# Patient Record
Sex: MALE | Race: WHITE | NOT HISPANIC OR LATINO | Employment: FULL TIME | URBAN - METROPOLITAN AREA
[De-identification: names, ages, dates, MRNs, and addresses within clinical notes are randomized per-mention and may not be internally consistent; named-entity substitution may affect disease eponyms.]

---

## 2020-06-01 ENCOUNTER — HISTORICAL (OUTPATIENT)
Dept: RESPIRATORY THERAPY | Facility: HOSPITAL | Age: 45
End: 2020-06-01

## 2021-05-07 ENCOUNTER — HISTORICAL (OUTPATIENT)
Dept: ADMINISTRATIVE | Facility: HOSPITAL | Age: 46
End: 2021-05-07

## 2021-05-07 LAB
ABS NEUT (OLG): 2.45 X10(3)/MCL (ref 2.1–9.2)
ALBUMIN SERPL-MCNC: 4.3 GM/DL (ref 3.5–5)
ALBUMIN/GLOB SERPL: 1.5 RATIO (ref 1.1–2)
ALP SERPL-CCNC: 69 UNIT/L (ref 40–150)
ALT SERPL-CCNC: 23 UNIT/L (ref 0–55)
AST SERPL-CCNC: 22 UNIT/L (ref 5–34)
BASOPHILS # BLD AUTO: 0 X10(3)/MCL (ref 0–0.2)
BASOPHILS NFR BLD AUTO: 1 %
BILIRUB SERPL-MCNC: 0.3 MG/DL
BILIRUBIN DIRECT+TOT PNL SERPL-MCNC: 0.1 MG/DL (ref 0–0.5)
BILIRUBIN DIRECT+TOT PNL SERPL-MCNC: 0.2 MG/DL (ref 0–0.8)
BUN SERPL-MCNC: 15.2 MG/DL (ref 8.9–20.6)
CALCIUM SERPL-MCNC: 10 MG/DL (ref 8.4–10.2)
CHLORIDE SERPL-SCNC: 104 MMOL/L (ref 98–107)
CO2 SERPL-SCNC: 27 MMOL/L (ref 22–29)
CREAT SERPL-MCNC: 1.18 MG/DL (ref 0.73–1.18)
EOSINOPHIL # BLD AUTO: 0.1 X10(3)/MCL (ref 0–0.9)
EOSINOPHIL NFR BLD AUTO: 2 %
ERYTHROCYTE [DISTWIDTH] IN BLOOD BY AUTOMATED COUNT: 12.8 % (ref 11.5–17)
GLOBULIN SER-MCNC: 2.9 GM/DL (ref 2.4–3.5)
GLUCOSE SERPL-MCNC: 178 MG/DL (ref 74–100)
HCT VFR BLD AUTO: 47.3 % (ref 42–52)
HGB BLD-MCNC: 15.3 GM/DL (ref 14–18)
LYMPHOCYTES # BLD AUTO: 2.2 X10(3)/MCL (ref 0.6–4.6)
LYMPHOCYTES NFR BLD AUTO: 43 %
MCH RBC QN AUTO: 29 PG (ref 27–31)
MCHC RBC AUTO-ENTMCNC: 32.3 GM/DL (ref 33–36)
MCV RBC AUTO: 89.6 FL (ref 80–94)
MONOCYTES # BLD AUTO: 0.4 X10(3)/MCL (ref 0.1–1.3)
MONOCYTES NFR BLD AUTO: 7 %
NEUTROPHILS # BLD AUTO: 2.45 X10(3)/MCL (ref 2.1–9.2)
NEUTROPHILS NFR BLD AUTO: 47 %
PLATELET # BLD AUTO: 261 X10(3)/MCL (ref 130–400)
PMV BLD AUTO: 10.1 FL (ref 9.4–12.4)
POTASSIUM SERPL-SCNC: 4.3 MMOL/L (ref 3.5–5.1)
PROT SERPL-MCNC: 7.2 GM/DL (ref 6.4–8.3)
PSA SERPL-MCNC: 0.45 NG/ML
RBC # BLD AUTO: 5.28 X10(6)/MCL (ref 4.7–6.1)
SODIUM SERPL-SCNC: 142 MMOL/L (ref 136–145)
WBC # SPEC AUTO: 5.2 X10(3)/MCL (ref 4.5–11.5)

## 2022-08-01 DIAGNOSIS — Z00.00 WELL ADULT HEALTH CHECK: Primary | ICD-10-CM

## 2022-08-17 DIAGNOSIS — Z00.00 WELL ADULT HEALTH CHECK: Primary | ICD-10-CM

## 2022-08-19 DIAGNOSIS — Z00.00 WELL ADULT HEALTH CHECK: Primary | ICD-10-CM

## 2022-08-23 ENCOUNTER — CLINICAL SUPPORT (OUTPATIENT)
Dept: INTERNAL MEDICINE | Facility: CLINIC | Age: 47
End: 2022-08-23
Payer: COMMERCIAL

## 2022-08-23 VITALS
SYSTOLIC BLOOD PRESSURE: 131 MMHG | TEMPERATURE: 98 F | HEART RATE: 62 BPM | BODY MASS INDEX: 28.93 KG/M2 | RESPIRATION RATE: 18 BRPM | DIASTOLIC BLOOD PRESSURE: 83 MMHG | OXYGEN SATURATION: 97 % | HEIGHT: 66 IN | WEIGHT: 180 LBS

## 2022-08-23 DIAGNOSIS — Z12.11 ENCOUNTER FOR COLORECTAL CANCER SCREENING: ICD-10-CM

## 2022-08-23 DIAGNOSIS — Z00.00 WELL ADULT HEALTH CHECK: ICD-10-CM

## 2022-08-23 DIAGNOSIS — E83.119 HEMOCHROMATOSIS, UNSPECIFIED HEMOCHROMATOSIS TYPE: Primary | ICD-10-CM

## 2022-08-23 DIAGNOSIS — Z12.12 ENCOUNTER FOR COLORECTAL CANCER SCREENING: ICD-10-CM

## 2022-08-23 LAB
(HCYS)2 SERPL-MCNC: 7.3 UMOL/L (ref 5.1–15.4)
ALBUMIN SERPL-MCNC: 4.3 GM/DL (ref 3.5–5)
ALBUMIN/GLOB SERPL: 1.4 RATIO (ref 1.1–2)
ALP SERPL-CCNC: 66 UNIT/L (ref 40–150)
ALT SERPL-CCNC: 26 UNIT/L (ref 0–55)
APPEARANCE UR: CLEAR
AST SERPL-CCNC: 30 UNIT/L (ref 5–34)
BACTERIA #/AREA URNS AUTO: ABNORMAL /HPF
BASOPHILS # BLD AUTO: 0.03 X10(3)/MCL (ref 0–0.2)
BASOPHILS NFR BLD AUTO: 0.6 %
BILIRUB UR QL STRIP.AUTO: NEGATIVE MG/DL
BILIRUBIN DIRECT+TOT PNL SERPL-MCNC: 0.4 MG/DL
BUN SERPL-MCNC: 18.5 MG/DL (ref 8.9–20.6)
CALCIUM SERPL-MCNC: 9.6 MG/DL (ref 8.4–10.2)
CHLORIDE SERPL-SCNC: 103 MMOL/L (ref 98–107)
CHOLEST SERPL-MCNC: 225 MG/DL
CHOLEST/HDLC SERPL: 6 {RATIO} (ref 0–5)
CO2 SERPL-SCNC: 28 MMOL/L (ref 22–29)
COLOR UR AUTO: COLORLESS
CREAT SERPL-MCNC: 1.06 MG/DL (ref 0.73–1.18)
CRP SERPL HS-MCNC: 0.79 MG/L
DEPRECATED CALCIDIOL+CALCIFEROL SERPL-MC: 33.7 NG/ML (ref 30–80)
DLCO: NORMAL
EOSINOPHIL # BLD AUTO: 0.12 X10(3)/MCL (ref 0–0.9)
EOSINOPHIL NFR BLD AUTO: 2.6 %
ERYTHROCYTE [DISTWIDTH] IN BLOOD BY AUTOMATED COUNT: 12.7 % (ref 11.5–17)
EST. AVERAGE GLUCOSE BLD GHB EST-MCNC: 111.2 MG/DL
FERRITIN SERPL-MCNC: 145.69 NG/ML (ref 21.81–274.66)
FEV1/FVC: NORMAL
FEV1: NORMAL
FVC: NORMAL
GFR SERPLBLD CREATININE-BSD FMLA CKD-EPI: >60 MLS/MIN/1.73/M2
GLOBULIN SER-MCNC: 3 GM/DL (ref 2.4–3.5)
GLUCOSE SERPL-MCNC: 117 MG/DL (ref 74–100)
GLUCOSE UR QL STRIP.AUTO: NORMAL MG/DL
HBA1C MFR BLD: 5.5 %
HCT VFR BLD AUTO: 46.6 % (ref 42–52)
HDLC SERPL-MCNC: 40 MG/DL (ref 35–60)
HGB BLD-MCNC: 16 GM/DL (ref 14–18)
HYALINE CASTS #/AREA URNS LPF: ABNORMAL /LPF
IMM GRANULOCYTES # BLD AUTO: 0.01 X10(3)/MCL (ref 0–0.04)
IMM GRANULOCYTES NFR BLD AUTO: 0.2 %
KETONES UR QL STRIP.AUTO: NEGATIVE MG/DL
LDLC SERPL CALC-MCNC: 163 MG/DL (ref 50–140)
LEUKOCYTE ESTERASE UR QL STRIP.AUTO: NEGATIVE UNIT/L
LYMPHOCYTES # BLD AUTO: 2 X10(3)/MCL (ref 0.6–4.6)
LYMPHOCYTES NFR BLD AUTO: 42.9 %
MCH RBC QN AUTO: 29.4 PG (ref 27–31)
MCHC RBC AUTO-ENTMCNC: 34.3 MG/DL (ref 33–36)
MCV RBC AUTO: 85.7 FL (ref 80–94)
MONOCYTES # BLD AUTO: 0.36 X10(3)/MCL (ref 0.1–1.3)
MONOCYTES NFR BLD AUTO: 7.7 %
MUCOUS THREADS URNS QL MICRO: ABNORMAL /LPF
NEUTROPHILS # BLD AUTO: 2.1 X10(3)/MCL (ref 2.1–9.2)
NEUTROPHILS NFR BLD AUTO: 46 %
NITRITE UR QL STRIP.AUTO: NEGATIVE
NRBC BLD AUTO-RTO: 0 %
PH UR STRIP.AUTO: 5.5 [PH]
PLATELET # BLD AUTO: 245 X10(3)/MCL (ref 130–400)
PMV BLD AUTO: 10.2 FL (ref 7.4–10.4)
POTASSIUM SERPL-SCNC: 4.2 MMOL/L (ref 3.5–5.1)
PROT SERPL-MCNC: 7.3 GM/DL (ref 6.4–8.3)
PROT UR QL STRIP.AUTO: NEGATIVE MG/DL
PSA SERPL-MCNC: 0.56 NG/ML
RBC # BLD AUTO: 5.44 X10(6)/MCL (ref 4.7–6.1)
RBC #/AREA URNS AUTO: ABNORMAL /HPF
RBC UR QL AUTO: NEGATIVE UNIT/L
SODIUM SERPL-SCNC: 141 MMOL/L (ref 136–145)
SP GR UR STRIP.AUTO: 1.01
SQUAMOUS #/AREA URNS LPF: ABNORMAL /HPF
T3FREE SERPL-MCNC: 2.62 PG/ML (ref 1.57–3.91)
T4 FREE SERPL-MCNC: 0.89 NG/DL (ref 0.7–1.48)
TESTOST SERPL-MCNC: 485.81 NG/DL (ref 240.24–870.68)
TLC: NORMAL
TRIGL SERPL-MCNC: 110 MG/DL (ref 34–140)
TSH SERPL-ACNC: 1.15 UIU/ML (ref 0.35–4.94)
UROBILINOGEN UR STRIP-ACNC: NORMAL MG/DL
VLDLC SERPL CALC-MCNC: 22 MG/DL
WBC # SPEC AUTO: 4.7 X10(3)/MCL (ref 4.5–11.5)
WBC #/AREA URNS AUTO: ABNORMAL /HPF

## 2022-08-23 PROCEDURE — 0358T PR BIOELECTRICAL IMPEDANCE WHOLE BODY COMPOSITION ASSESSMENT W/I&R: ICD-10-PCS | Mod: ,,, | Performed by: FAMILY MEDICINE

## 2022-08-23 PROCEDURE — 92551 PURE TONE HEARING TEST AIR: CPT | Mod: ,,, | Performed by: FAMILY MEDICINE

## 2022-08-23 PROCEDURE — 85025 COMPLETE CBC W/AUTO DIFF WBC: CPT

## 2022-08-23 PROCEDURE — 84481 FREE ASSAY (FT-3): CPT

## 2022-08-23 PROCEDURE — 81001 URINALYSIS AUTO W/SCOPE: CPT

## 2022-08-23 PROCEDURE — 99499 PR EXECUTIVE HEALTH VISIT WITH INSURANCE: ICD-10-PCS | Mod: ,,, | Performed by: FAMILY MEDICINE

## 2022-08-23 PROCEDURE — 84443 ASSAY THYROID STIM HORMONE: CPT

## 2022-08-23 PROCEDURE — 99499 UNLISTED E&M SERVICE: CPT | Mod: ,,, | Performed by: FAMILY MEDICINE

## 2022-08-23 PROCEDURE — 93015 CV STRESS TEST SUPVJ I&R: CPT | Mod: ,,, | Performed by: FAMILY MEDICINE

## 2022-08-23 PROCEDURE — 92551 PR PURE TONE HEARING TEST, AIR: ICD-10-PCS | Mod: ,,, | Performed by: FAMILY MEDICINE

## 2022-08-23 PROCEDURE — 93010 EKG 12-LEAD: ICD-10-PCS | Mod: ,,, | Performed by: FAMILY MEDICINE

## 2022-08-23 PROCEDURE — 0358T BIA WHOLE BODY: CPT | Mod: ,,, | Performed by: FAMILY MEDICINE

## 2022-08-23 PROCEDURE — 36415 COLL VENOUS BLD VENIPUNCTURE: CPT | Mod: ,,, | Performed by: FAMILY MEDICINE

## 2022-08-23 PROCEDURE — 93010 ELECTROCARDIOGRAM REPORT: CPT | Mod: ,,, | Performed by: FAMILY MEDICINE

## 2022-08-23 PROCEDURE — 84403 ASSAY OF TOTAL TESTOSTERONE: CPT

## 2022-08-23 PROCEDURE — 83036 HEMOGLOBIN GLYCOSYLATED A1C: CPT

## 2022-08-23 PROCEDURE — 86901 BLOOD TYPING SEROLOGIC RH(D): CPT | Performed by: FAMILY MEDICINE

## 2022-08-23 PROCEDURE — 93005 ELECTROCARDIOGRAM TRACING: CPT | Mod: ,,, | Performed by: FAMILY MEDICINE

## 2022-08-23 PROCEDURE — 93015 PR CARDIAC STRESS TST,COMPLETE: ICD-10-PCS | Mod: ,,, | Performed by: FAMILY MEDICINE

## 2022-08-23 PROCEDURE — 99172 OCULAR FUNCTION SCREEN: CPT | Mod: ,,, | Performed by: FAMILY MEDICINE

## 2022-08-23 PROCEDURE — 93005 EKG 12-LEAD: ICD-10-PCS | Mod: ,,, | Performed by: FAMILY MEDICINE

## 2022-08-23 PROCEDURE — 80053 COMPREHEN METABOLIC PANEL: CPT

## 2022-08-23 PROCEDURE — 99172 PR VISUAL FUNCT SCREENING, BILAT: ICD-10-PCS | Mod: ,,, | Performed by: FAMILY MEDICINE

## 2022-08-23 PROCEDURE — 83090 ASSAY OF HOMOCYSTEINE: CPT

## 2022-08-23 PROCEDURE — 80061 LIPID PANEL: CPT

## 2022-08-23 PROCEDURE — 36415 PR COLLECTION VENOUS BLOOD,VENIPUNCTURE: ICD-10-PCS | Mod: ,,, | Performed by: FAMILY MEDICINE

## 2022-08-23 PROCEDURE — 94010 BREATHING CAPACITY TEST: CPT | Mod: ,,, | Performed by: FAMILY MEDICINE

## 2022-08-23 PROCEDURE — 86141 C-REACTIVE PROTEIN HS: CPT

## 2022-08-23 PROCEDURE — 94010 BREATHING CAPACITY TEST: ICD-10-PCS | Mod: ,,, | Performed by: FAMILY MEDICINE

## 2022-08-23 PROCEDURE — 82728 ASSAY OF FERRITIN: CPT

## 2022-08-23 PROCEDURE — 84439 ASSAY OF FREE THYROXINE: CPT

## 2022-08-23 PROCEDURE — 84153 ASSAY OF PSA TOTAL: CPT

## 2022-08-23 PROCEDURE — 82306 VITAMIN D 25 HYDROXY: CPT

## 2022-08-23 NOTE — PROGRESS NOTES
Subjective:       Patient ID: Garth Flores is a 46 y.o. male.    Chief Complaint: Executive Health Physical.    Garth Flores is a 46 y.o. year old male, who presents today for a preventive medical evaluation.    He reports in the last year he has missed work due to illness only 3 days at which time he had Covid (December 2021).    CURRENT MEDICAL PROBLEMS  Father Sandra reports having depression and anxiety for which he take Trintellix. This was first diagnosed in 2007. He also reports having seasonal allergies diagnosed in 2006 which is controlled with Zyrtec when needed.     He reports that he has been diagnosed with hemachromotosis, or perhaps is a carrier. He is unsure. He received 2 therapeutic phelebotmy  since 2019. His last one was in 2021.   He has a past history of mono-hepatitis and last year had a liver ultrasound. It was recommended at that time to have a repeat US but he missed that appointment. This was done at [facility]. He reports that they found a lesion on this ultrasound and was told it was not too concerning.     PAST MEDICAL HISTORY    Immunizations:  He has his tetanus booster in the last 10 years.  He has received the annual flu vaccine.  He  has not received his Pneumonia vaccine.   He reports he has taken both Hepatitis A and B vaccines.  He has not taken his Shingles vaccine.   He  has taken his Covid Vaccine.     Drug Allergies:  The patient reports no known drug allergies.    Medical Illnesses:  See Current Medical Problems and Review of Systems    Surgical Illnesses:  None    Diagnostic Studies (year completed):  EKG, 2022   Echocardiogram, 2018 (mild to moderate mitral valve prolapse in 2017, repeat in 2018-unchanged)  Carotid Ultrasound, 2019  Abdominal/Pelvic Ultrasound, 2020          PERSONAL HABITS  He does not smoke or use tobacco products. He has a past history of tobacco use. He smoked cigarettes for 17 years (6-10cigs/day. He stopped in 2016. He has a 9 pack year history.   He drinks approximately 5 alcoholic beverages per week. He drinks approximately 11 caffeinated beverages per week. He reports that he is currently following a regular diet. Nearly all of his meals are prepared at home and eats out 5 times per week. He drinks 28 glasses of water a week. On average, he obtains 6-7 hours of sleep each night.    EXERCISE HISTORY  Currently, he exercises on average 5 days a week for 20-40 minutes each time. For aerobic activity he does stair climbers or stationary bike as well as outdoor walking, 3 days per week. For resistance training he does moderate intensity weight training and resistance bands, 5 days per week. And for flexibility training he does yoga, 1 times per week.     STRESS FACTORS  The patient considers his home life to be high stress and his occupation to be of high stress. His greatest source of worry or concern is his Job and Health. He feels that he manages his stress fairly well most of the time. He rates his emotional outlook on life as happy and sad in equal amounts. He rates his overall health as excellent    FAMILY HISTORY  The patient's father  at the age of 74 and had the following medical issues: diabetes, multiple myeloma, quadruple coronary bypass surgery and obesity. His mother also  at the age of 74 from a stroke. She also had morbid obesity and varicose veins. He has 2 siblings, ages 59 and 50 who both have morbid obesity, diabetes and varicose veins.       Care Team (last visit):  PCP: None  Psychiatry: Crys Cooper, NP  Urology: Dr. Elliot Cho (2022) for trouble urinating  Cardiology: Dr. Rod Greene (May 2022) history of abnormal heartbeat,   Allergy: Kishor Rosales (2022)  Eye: Lens Crafter  Chiropractor: Romeo Alvarez, back/neck     Review of Systems   Constitutional: Negative for chills, diaphoresis, fatigue, fever and unexpected weight change.   HENT: Negative for nasal congestion, ear pain, hearing loss, postnasal drip,  "sore throat and trouble swallowing.    Eyes: Negative for redness and visual disturbance.        Reports Decrease in vision since 2012 which is still a problem today. His last exam was done in June 2021.    Respiratory: Negative for cough, chest tightness, shortness of breath and wheezing.    Cardiovascular: Negative for chest pain, palpitations, leg swelling and claudication.   Gastrointestinal: Negative for abdominal pain, anal bleeding, blood in stool, change in bowel habit, constipation, diarrhea, nausea and change in bowel habit.   Genitourinary: Positive for difficulty urinating. Negative for bladder incontinence, dysuria, enuresis, frequency, testicular pain and urgency.        He reports increased awakening at night to urinate.    Musculoskeletal: Negative for arthralgias, back pain, gait problem, joint swelling and myalgias.   Integumentary:  Negative for rash and mole/lesion.        + nail fungus, bilateral large toes. No pain or loss of nail   Neurological: Negative for dizziness, vertigo, light-headedness, headaches and memory loss.   Hematological: Does not bruise/bleed easily.   Psychiatric/Behavioral: Positive for sleep disturbance. Negative for decreased concentration, dysphoric mood, self-injury and suicidal ideas. The patient is nervous/anxious.          Objective:     Weight: 81.6 kg (180 lb), Height: 5' 6" (1.676 m), BSA (Calculated - sq m): 1.95 sq meters, BMI (Calculated): 29.1, BP: 131/83, Pulse: 62, Resp: 18, Temp: 98.1 °F (36.7 °C), SpO2: 97 %    Physical Exam  Vitals reviewed.   Constitutional:       General: He is not in acute distress.     Appearance: Normal appearance.   HENT:      Right Ear: Tympanic membrane, ear canal and external ear normal.      Left Ear: Tympanic membrane, ear canal and external ear normal.      Nose: Congestion present.      Mouth/Throat:      Mouth: Mucous membranes are moist.      Pharynx: Oropharynx is clear.   Eyes:      Extraocular Movements: Extraocular " movements intact.      Conjunctiva/sclera: Conjunctivae normal.      Pupils: Pupils are equal, round, and reactive to light.   Neck:      Vascular: No carotid bruit.   Cardiovascular:      Rate and Rhythm: Normal rate and regular rhythm.      Pulses: Normal pulses.           Femoral pulses are 2+ on the right side and 2+ on the left side.       Dorsalis pedis pulses are 2+ on the right side and 2+ on the left side.        Posterior tibial pulses are 2+ on the right side and 2+ on the left side.      Heart sounds: Normal heart sounds. No murmur heard.    No friction rub. No gallop.   Pulmonary:      Effort: Pulmonary effort is normal.      Breath sounds: Normal breath sounds. No wheezing, rhonchi or rales.   Abdominal:      General: Abdomen is flat. Bowel sounds are normal.      Palpations: Abdomen is soft.      Tenderness: There is no abdominal tenderness. There is no right CVA tenderness or left CVA tenderness.   Musculoskeletal:      Cervical back: Normal range of motion and neck supple.      Right lower leg: No edema.      Left lower leg: No edema.   Lymphadenopathy:      Cervical: No cervical adenopathy.   Skin:     General: Skin is warm and dry.      Capillary Refill: Capillary refill takes less than 2 seconds.      Findings: No lesion or rash.      Comments: Bilateral Large toe nails with mild discoloration. No thickening or tenderness. No onycholysis noted.    Neurological:      General: No focal deficit present.      Mental Status: He is alert and oriented to person, place, and time.      Sensory: No sensory deficit.      Motor: No weakness.      Deep Tendon Reflexes: Reflexes normal.   Psychiatric:         Mood and Affect: Mood normal. Mood is not anxious or depressed. Affect is not blunt.         Speech: Speech normal.         Behavior: Behavior normal.         Thought Content: Thought content normal.         Judgment: Judgment normal.       BIOMETRICS:       Hearing Screening    125Hz 250Hz 500Hz 1000Hz  2000Hz 3000Hz 4000Hz 6000Hz 8000Hz   Right ear:   25 25 25 25 25     Left ear:   25 25 25 25 25        Visual Acuity Screening- Near    Right eye Left eye Both eyes   Without correction: 20/20 20/20 20/20   With correction:      Visual Acuity Screening- Far  40/20 bilateral     Spirometry (see official report)  Normal spirometry    RADIOLOGY:  see official reports  Calcium Score--SCORE SUMMARY. Your total calcium score is 0  Incidental findings: A calcified granuloma is noted in the right lung.      Resting ECG: Rate 72 bpm; Normal Sinus Rhythm; Normal Axis  Impression: Resting electrocardiogram is probably normal. There are no previous studies for comparison.    Stress ECG: Indications for stress testing: Physical fitness assessment, development of an exercise prescription and cardiovascular risk assessment.  EKG and exercise stress test showed a resting heart rate prior to exam of 70 beats per minute, with a blood pressure of 114/80 at the start of the examination. Julio protocol was used for evaluation of stress test. Initial EKG showed normal axis, MI, QRS, and QT intervals, demonstrating a normal EKG. The patient was able to undergo Julio protocol for a total of 10:24, achieving a heart rate maximum of 148 beats per minute, which represents 86 % of predicted maximum. Pericardial leads V2 and V5 and standard lead II were monitored continuously throughout evaluation. All EKG leads were scanned periodically and reported at regular intervals. The patient experienced no chest pain or other cardiac symptoms of significance during or after the examination. The blood pressure response was normal throughout exercise and recovery. There were no electrocardiographic changes consistent with ischemia, and there were no significant abnormalities of patient rhythm.Uncoupled PVCs were noted on exam.   Impression: Normal stress electrocardiogram with no significant signs of ST segment changes that would be consistent with  obvious coronary ischemia.    InBody Assessment  BMI is elevated at 28.7  Percent body fat is elavated at 27.6%  Visceral Fat Area is normal at 95.5 cm2  Recommendation to lose 26lbs of fat mass.       Assessment:       Problem List Items Addressed This Visit    None         Plan:       Impression & Plan:     Physical Exam: Normal exam.     Laboratory Results: Abnormal -   -Cholesterol is elevated. Continue heart healthy diet.   -Fasting blood glucose is elevated. Limit foods with added sugars.      Imaging Results: Normal      Hearing Screening: Normal     Vision Screening: Abnormal - Recommend getting formal eye exam     Lung Function Screening: Normal     ECG/Exercise Treadmill Test: See Impression above. Repeat in 1 year.     Cardiovascular Risk Assessment: Low Risk    ERWIN score: 3.1%   Plan: control cholesterol with strict diet. Recommend heart healthy diet.      Cancer Screening: Colorectal cancer screening is due.       Immunizations: is up to date     In Body Assessment: Abnormal - see recommendations. Increase skeletal muscle mass.          Plan: Exercise Prescription         Frequency: 3-5 sessions/week         Intensity: Moderate (RPE: 10-13)         Type: Aerobic: walking, stationary cycling, aquatic exercise, running          Time: 30 minutes (150 minutes/week)           Plus         Frequency: 2-3 session/week         Type: Strength/Resistance Training: exercises using resistance bands, weight machines, handheld  weights or body weight            Plus         Frequency: 5-7 sessions/week         Type: Flexibility training: stretching, yoga, ariana chi         Time: 5-10 minutes      Plan: Diet recommendations: Lower carb, heart healthy diet such as the Mediterranean-style diet.   -Eat an overall healthy dietary pattern that emphasizes:   -a wide variety of fruits and vegetables   -whole grains and products made up mostly of whole grains   -healthy sources of protein (mostly plants such as legumes and  nuts; fish and seafood; low fat or nonfat dairy; and , if you eat meat and poultry, ensuring it is lean and unprocessed)   -liquid non-tropical vegetable oils   -minimally processed foods   -minimized intake of added sugars   -food prepared with little or no salt   -limited or preferably no alcohol intake    Total daily energy expenditure is 2,542 usman/day  Consider a 500 calorie per day deficit to promote weight loss: 2,042 usman/day.   Daily Macronutrients: for Lower Carb (40/40/20)      204g protein      91g fats      102g carbs    Early identification and prevention are the keys to maintaining overall health and prevention of chronic diseases. For this reason, I recommend yearly physical examinations through this program or with your primary care physician.     Referral sent: PCP  Prescriptions sent:    None    Summary of recommendations:  -Get established with PCP. We will send in a referral   1.  for hemochromatosis follow up and treatment is needed. Will need to get repeat liver Ultrasound as recommended last year.    2. for colorectal cancer screening set up.    3. for repeat imaging of calcified granuloma in Right lung found on CT Calcium score imaging.    4. for increased night time urination.  -Get formal eye exam to test far visual acuity   -Follow a heart healthy diet, consider Mediterranean style diet

## 2022-08-29 ENCOUNTER — TELEPHONE (OUTPATIENT)
Dept: INTERNAL MEDICINE | Facility: CLINIC | Age: 47
End: 2022-08-29
Payer: COMMERCIAL

## 2022-08-29 NOTE — TELEPHONE ENCOUNTER
----- Message from Joellen Fournier MD sent at 8/29/2022  4:06 PM CDT -----  Please schedule patient for a appointment    ----- Message -----  From: Martha Franco LPN  Sent: 8/29/2022   1:37 PM CDT  To: Joellen Fournier MD    Please schedule patient for appt. See Dr. Lopez 's note....

## 2022-09-01 ENCOUNTER — TELEPHONE (OUTPATIENT)
Dept: INTERNAL MEDICINE | Facility: CLINIC | Age: 47
End: 2022-09-01
Payer: COMMERCIAL

## 2022-09-22 DIAGNOSIS — Z00.00 WELLNESS EXAMINATION: Primary | ICD-10-CM

## 2022-09-22 DIAGNOSIS — Z12.5 SCREENING FOR PROSTATE CANCER: ICD-10-CM

## 2022-10-03 ENCOUNTER — OFFICE VISIT (OUTPATIENT)
Dept: INTERNAL MEDICINE | Facility: CLINIC | Age: 47
End: 2022-10-03
Payer: COMMERCIAL

## 2022-10-03 VITALS
SYSTOLIC BLOOD PRESSURE: 112 MMHG | HEART RATE: 77 BPM | WEIGHT: 180.19 LBS | DIASTOLIC BLOOD PRESSURE: 72 MMHG | TEMPERATURE: 98 F | HEIGHT: 66 IN | OXYGEN SATURATION: 97 % | BODY MASS INDEX: 28.96 KG/M2

## 2022-10-03 DIAGNOSIS — Z12.12 ENCOUNTER FOR COLORECTAL CANCER SCREENING: ICD-10-CM

## 2022-10-03 DIAGNOSIS — Z12.11 ENCOUNTER FOR COLORECTAL CANCER SCREENING: ICD-10-CM

## 2022-10-03 DIAGNOSIS — F32.A ANXIETY AND DEPRESSION: ICD-10-CM

## 2022-10-03 DIAGNOSIS — Z12.11 SCREEN FOR COLON CANCER: Primary | ICD-10-CM

## 2022-10-03 DIAGNOSIS — Z23 NEED FOR VACCINATION: ICD-10-CM

## 2022-10-03 DIAGNOSIS — E83.119 HEMOCHROMATOSIS, UNSPECIFIED HEMOCHROMATOSIS TYPE: ICD-10-CM

## 2022-10-03 DIAGNOSIS — F41.9 ANXIETY AND DEPRESSION: ICD-10-CM

## 2022-10-03 DIAGNOSIS — E78.00 PURE HYPERCHOLESTEROLEMIA: ICD-10-CM

## 2022-10-03 DIAGNOSIS — Z86.19 HISTORY OF GONORRHEA: Primary | ICD-10-CM

## 2022-10-03 DIAGNOSIS — Z00.00 WELLNESS EXAMINATION: ICD-10-CM

## 2022-10-03 PROCEDURE — 1160F PR REVIEW ALL MEDS BY PRESCRIBER/CLIN PHARMACIST DOCUMENTED: ICD-10-PCS | Mod: CPTII,,, | Performed by: INTERNAL MEDICINE

## 2022-10-03 PROCEDURE — 1159F MED LIST DOCD IN RCRD: CPT | Mod: CPTII,,, | Performed by: INTERNAL MEDICINE

## 2022-10-03 PROCEDURE — 3008F BODY MASS INDEX DOCD: CPT | Mod: CPTII,,, | Performed by: INTERNAL MEDICINE

## 2022-10-03 PROCEDURE — 99396 PR PREVENTIVE VISIT,EST,40-64: ICD-10-PCS | Mod: 25,,, | Performed by: INTERNAL MEDICINE

## 2022-10-03 PROCEDURE — 1159F PR MEDICATION LIST DOCUMENTED IN MEDICAL RECORD: ICD-10-PCS | Mod: CPTII,,, | Performed by: INTERNAL MEDICINE

## 2022-10-03 PROCEDURE — 90686 IIV4 VACC NO PRSV 0.5 ML IM: CPT | Mod: ,,, | Performed by: INTERNAL MEDICINE

## 2022-10-03 PROCEDURE — 3008F PR BODY MASS INDEX (BMI) DOCUMENTED: ICD-10-PCS | Mod: CPTII,,, | Performed by: INTERNAL MEDICINE

## 2022-10-03 PROCEDURE — 3074F PR MOST RECENT SYSTOLIC BLOOD PRESSURE < 130 MM HG: ICD-10-PCS | Mod: CPTII,,, | Performed by: INTERNAL MEDICINE

## 2022-10-03 PROCEDURE — 90471 IMMUNIZATION ADMIN: CPT | Mod: ,,, | Performed by: INTERNAL MEDICINE

## 2022-10-03 PROCEDURE — 90686 FLU VACCINE (QUAD) GREATER THAN OR EQUAL TO 3YO PRESERVATIVE FREE IM: ICD-10-PCS | Mod: ,,, | Performed by: INTERNAL MEDICINE

## 2022-10-03 PROCEDURE — 3078F DIAST BP <80 MM HG: CPT | Mod: CPTII,,, | Performed by: INTERNAL MEDICINE

## 2022-10-03 PROCEDURE — 3078F PR MOST RECENT DIASTOLIC BLOOD PRESSURE < 80 MM HG: ICD-10-PCS | Mod: CPTII,,, | Performed by: INTERNAL MEDICINE

## 2022-10-03 PROCEDURE — 99396 PREV VISIT EST AGE 40-64: CPT | Mod: 25,,, | Performed by: INTERNAL MEDICINE

## 2022-10-03 PROCEDURE — 90471 FLU VACCINE (QUAD) GREATER THAN OR EQUAL TO 3YO PRESERVATIVE FREE IM: ICD-10-PCS | Mod: ,,, | Performed by: INTERNAL MEDICINE

## 2022-10-03 PROCEDURE — 1160F RVW MEDS BY RX/DR IN RCRD: CPT | Mod: CPTII,,, | Performed by: INTERNAL MEDICINE

## 2022-10-03 PROCEDURE — 3074F SYST BP LT 130 MM HG: CPT | Mod: CPTII,,, | Performed by: INTERNAL MEDICINE

## 2022-10-03 RX ORDER — OMEPRAZOLE 20 MG/1
20 CAPSULE, DELAYED RELEASE ORAL 2 TIMES DAILY
COMMUNITY
Start: 2022-09-28 | End: 2023-12-18

## 2022-10-03 RX ORDER — CETIRIZINE HYDROCHLORIDE 10 MG/1
1 CAPSULE, LIQUID FILLED ORAL DAILY
COMMUNITY

## 2022-10-03 RX ORDER — AMOXICILLIN 500 MG/1
1 CAPSULE ORAL 2 TIMES DAILY
COMMUNITY
Start: 2022-09-28 | End: 2023-12-18 | Stop reason: ALTCHOICE

## 2022-10-03 RX ORDER — CLARITHROMYCIN 500 MG/1
500 TABLET, FILM COATED ORAL 2 TIMES DAILY
COMMUNITY
Start: 2022-09-28 | End: 2023-12-18

## 2022-10-03 RX ORDER — METRONIDAZOLE 500 MG/1
500 TABLET ORAL 2 TIMES DAILY
COMMUNITY
Start: 2022-09-28 | End: 2023-12-18 | Stop reason: ALTCHOICE

## 2022-10-03 NOTE — ASSESSMENT & PLAN NOTE
Stressed importance of dietary modifications. Follow a low cholesterol, low saturated fat diet with less that 200mg of cholesterol a day.  Avoid fried foods and high saturated fats (high saturated fats less than 7% of calories).  Add Flax Seed/Fish Oil supplements to diet. Increase dietary fiber.  Regular exercise can reduce LDL and raise HDL. Stressed importance of physical activity 5 times per week for 30 minutes per day.

## 2022-10-03 NOTE — ASSESSMENT & PLAN NOTE
On Trintellix,  Continue   Practice deep breathing or abdominal breathing exercises when anxiety occurs.  Exercise daily. Get sunlight daily.  Avoid caffeine, alcohol and stimulants.  Practice positive phrases and repeat throughout the day, along with yoga and relaxation techniques.  Set healthy boundaries, avoid people and conversations that increase stress.  Educated patient on the risks of serotonin based medications such as serotonin modulators and SSRIs/SNRIs including common side effects of nausea, GI upset, headache dizziness as well as the rare risk for worsening symptoms of depression including development of suicidal thoughts or ideations, and serotonin syndrome.

## 2022-10-03 NOTE — PROGRESS NOTES
Subjective:      Patient ID: Garth Flores is a 46 y.o. male.    Chief Complaint: Annual Exam (Wellness)    Garth is a 44-year-old gentleman who was seen today for a wellness visit. He was followed by Alexandria Yanez at [facility].   Apparently does have a diagnosis of hemochromatosis and has he did therapeutic phlebotomy in the past.      Also has a history of multiple STDs including chlamydia, gonorrhea, genital warts etc. Admits to having multiple sexual partners. Has tried Truvada for preventative purposes however had elevation in LFTs and it was discontinued.   Also has chronic allergy issues and depression.  Currently stable from that standpoint   Recently was diagnosed with H pylori and is undergoing treatment with amoxicillin, clarithromycin, metronidazole and omeprazole   Will go ahead and get a wellness exam achieved since today he does not have any acute needs or complaints.    The patient's Health Maintenance was reviewed and the following appears to be due at this time:   Health Maintenance Due   Topic Date Due    Colorectal Cancer Screening  Never done        Past Medical History:  Past Medical History:   Diagnosis Date    Allergies     Anxiety disorder, unspecified     Depression     Hemochromatosis, unspecified     History of sexually transmitted disease      History reviewed. No pertinent surgical history.  Review of patient's allergies indicates:  No Known Allergies  Social History     Socioeconomic History    Marital status: Single   Occupational History    Occupation: [employer]     Employer: [employer]   Tobacco Use    Smoking status: Former     Packs/day: 0.50     Years: 17.00     Pack years: 8.50     Types: Cigarettes     Start date:      Quit date: 2016     Years since quittin.7    Smokeless tobacco: Never   Substance and Sexual Activity    Alcohol use: Not Currently     Alcohol/week: 3.0 standard drinks     Types: 3 Shots of liquor per week    Drug use: Not Currently      "Types: Marijuana     Family History   Problem Relation Age of Onset    Stroke Mother     Obesity Mother     Heart disease Father     Obesity Father     Multiple myeloma Father     Diabetes Father     Obesity Sister     Diabetes Sister     Diabetes Brother     Obesity Brother        Review of Systems    A comprehensive review of systems was performed and is negative except for that stated above  Objective:   /72 (BP Location: Left arm, Patient Position: Sitting, BP Method: Small (Manual))   Pulse 77   Temp 97.6 °F (36.4 °C) (Temporal)   Ht 5' 6" (1.676 m)   Wt 81.7 kg (180 lb 3.2 oz)   SpO2 97%   BMI 29.09 kg/m²     Physical Exam  Constitutional:       Appearance: Normal appearance.   HENT:      Head: Normocephalic and atraumatic.      Nose: Nose normal.      Mouth/Throat:      Mouth: Mucous membranes are moist.      Pharynx: Oropharynx is clear.   Eyes:      Extraocular Movements: Extraocular movements intact.      Pupils: Pupils are equal, round, and reactive to light.   Cardiovascular:      Rate and Rhythm: Normal rate and regular rhythm.      Pulses: Normal pulses.   Pulmonary:      Effort: Pulmonary effort is normal.      Breath sounds: Normal breath sounds.   Abdominal:      General: Bowel sounds are normal.      Palpations: Abdomen is soft.   Musculoskeletal:         General: Normal range of motion.      Cervical back: Normal range of motion and neck supple.   Skin:     General: Skin is warm.   Neurological:      General: No focal deficit present.      Mental Status: He is alert and oriented to person, place, and time. Mental status is at baseline.   Psychiatric:         Mood and Affect: Mood normal.     Assessment/ Plan:   1. History of gonorrhea  Assessment & Plan:  As treated, resolved      2. Hemochromatosis, unspecified hemochromatosis type  Assessment & Plan:  -monitor LFTs and CBCs every 6 months    Orders:  -     Ambulatory referral/consult to Internal Medicine    3. Encounter for " colorectal cancer screening  -     Ambulatory referral/consult to Internal Medicine    4. Anxiety and depression  Assessment & Plan:  On Trintellix,  Continue   Practice deep breathing or abdominal breathing exercises when anxiety occurs.  Exercise daily. Get sunlight daily.  Avoid caffeine, alcohol and stimulants.  Practice positive phrases and repeat throughout the day, along with yoga and relaxation techniques.  Set healthy boundaries, avoid people and conversations that increase stress.  Educated patient on the risks of serotonin based medications such as serotonin modulators and SSRIs/SNRIs including common side effects of nausea, GI upset, headache dizziness as well as the rare risk for worsening symptoms of depression including development of suicidal thoughts or ideations, and serotonin syndrome.         5. Wellness examination  Assessment & Plan:  -labs are reviewed all essentially normal except elevated LDL  -patient is advised on importance of watching her carbohydrate intake and saturated fat intake, making the right nutritional choices and exercising on a regular basis  -up-to-date with the screening      6. Pure hypercholesterolemia  Assessment & Plan:    Stressed importance of dietary modifications. Follow a low cholesterol, low saturated fat diet with less that 200mg of cholesterol a day.  Avoid fried foods and high saturated fats (high saturated fats less than 7% of calories).  Add Flax Seed/Fish Oil supplements to diet. Increase dietary fiber.  Regular exercise can reduce LDL and raise HDL. Stressed importance of physical activity 5 times per week for 30 minutes per day.       7. Need for vaccination  -     Influenza - Quadrivalent (PF)

## 2022-10-03 NOTE — ASSESSMENT & PLAN NOTE
-labs are reviewed all essentially normal except elevated LDL  -patient is advised on importance of watching her carbohydrate intake and saturated fat intake, making the right nutritional choices and exercising on a regular basis  -up-to-date with the screening

## 2022-10-07 ENCOUNTER — DOCUMENTATION ONLY (OUTPATIENT)
Dept: INTERNAL MEDICINE | Facility: CLINIC | Age: 47
End: 2022-10-07
Payer: COMMERCIAL

## 2023-01-02 PROBLEM — Z00.00 WELLNESS EXAMINATION: Status: RESOLVED | Noted: 2022-10-03 | Resolved: 2023-01-02

## 2023-01-03 ENCOUNTER — PATIENT MESSAGE (OUTPATIENT)
Dept: INTERNAL MEDICINE | Facility: CLINIC | Age: 48
End: 2023-01-03
Payer: COMMERCIAL

## 2023-01-05 ENCOUNTER — PATIENT MESSAGE (OUTPATIENT)
Dept: INTERNAL MEDICINE | Facility: CLINIC | Age: 48
End: 2023-01-05

## 2023-01-05 ENCOUNTER — OFFICE VISIT (OUTPATIENT)
Dept: INTERNAL MEDICINE | Facility: CLINIC | Age: 48
End: 2023-01-05
Payer: COMMERCIAL

## 2023-01-05 VITALS
HEIGHT: 66 IN | SYSTOLIC BLOOD PRESSURE: 118 MMHG | DIASTOLIC BLOOD PRESSURE: 80 MMHG | HEART RATE: 68 BPM | WEIGHT: 187 LBS | RESPIRATION RATE: 16 BRPM | BODY MASS INDEX: 30.05 KG/M2 | OXYGEN SATURATION: 97 %

## 2023-01-05 DIAGNOSIS — J06.9 BACTERIAL URI: Primary | ICD-10-CM

## 2023-01-05 DIAGNOSIS — B96.89 BACTERIAL URI: Primary | ICD-10-CM

## 2023-01-05 DIAGNOSIS — J06.9 UPPER RESPIRATORY TRACT INFECTION, UNSPECIFIED TYPE: Primary | ICD-10-CM

## 2023-01-05 DIAGNOSIS — R06.2 WHEEZING: ICD-10-CM

## 2023-01-05 LAB
FLUAV AG UPPER RESP QL IA.RAPID: NOT DETECTED
FLUBV AG UPPER RESP QL IA.RAPID: NOT DETECTED
RSV A 5' UTR RNA NPH QL NAA+PROBE: NOT DETECTED
SARS-COV-2 RNA RESP QL NAA+PROBE: NOT DETECTED

## 2023-01-05 PROCEDURE — 0241U COVID/RSV/FLU A&B PCR: CPT

## 2023-01-05 PROCEDURE — 1159F MED LIST DOCD IN RCRD: CPT | Mod: CPTII,,,

## 2023-01-05 PROCEDURE — 3074F PR MOST RECENT SYSTOLIC BLOOD PRESSURE < 130 MM HG: ICD-10-PCS | Mod: CPTII,,,

## 2023-01-05 PROCEDURE — 3008F BODY MASS INDEX DOCD: CPT | Mod: CPTII,,,

## 2023-01-05 PROCEDURE — 3008F PR BODY MASS INDEX (BMI) DOCUMENTED: ICD-10-PCS | Mod: CPTII,,,

## 2023-01-05 PROCEDURE — 1160F PR REVIEW ALL MEDS BY PRESCRIBER/CLIN PHARMACIST DOCUMENTED: ICD-10-PCS | Mod: CPTII,,,

## 2023-01-05 PROCEDURE — 3079F PR MOST RECENT DIASTOLIC BLOOD PRESSURE 80-89 MM HG: ICD-10-PCS | Mod: CPTII,,,

## 2023-01-05 PROCEDURE — 99214 OFFICE O/P EST MOD 30 MIN: CPT | Mod: 25,,,

## 2023-01-05 PROCEDURE — 3074F SYST BP LT 130 MM HG: CPT | Mod: CPTII,,,

## 2023-01-05 PROCEDURE — 96372 THER/PROPH/DIAG INJ SC/IM: CPT | Mod: ,,,

## 2023-01-05 PROCEDURE — 1160F RVW MEDS BY RX/DR IN RCRD: CPT | Mod: CPTII,,,

## 2023-01-05 PROCEDURE — 1159F PR MEDICATION LIST DOCUMENTED IN MEDICAL RECORD: ICD-10-PCS | Mod: CPTII,,,

## 2023-01-05 PROCEDURE — 96372 PR INJECTION,THERAP/PROPH/DIAG2ST, IM OR SUBCUT: ICD-10-PCS | Mod: ,,,

## 2023-01-05 PROCEDURE — 99214 PR OFFICE/OUTPT VISIT, EST, LEVL IV, 30-39 MIN: ICD-10-PCS | Mod: 25,,,

## 2023-01-05 PROCEDURE — 3079F DIAST BP 80-89 MM HG: CPT | Mod: CPTII,,,

## 2023-01-05 RX ORDER — GUAIFENESIN 600 MG/1
1200 TABLET, EXTENDED RELEASE ORAL 2 TIMES DAILY
COMMUNITY

## 2023-01-05 RX ORDER — ALBUTEROL SULFATE 90 UG/1
2 AEROSOL, METERED RESPIRATORY (INHALATION) EVERY 6 HOURS PRN
Qty: 18 G | Refills: 0 | Status: SHIPPED | OUTPATIENT
Start: 2023-01-05 | End: 2023-01-05

## 2023-01-05 RX ORDER — AZITHROMYCIN 250 MG/1
TABLET, FILM COATED ORAL
Qty: 6 TABLET | Refills: 0 | Status: SHIPPED | OUTPATIENT
Start: 2023-01-05 | End: 2023-01-10

## 2023-01-05 RX ORDER — METHYLPREDNISOLONE 4 MG/1
TABLET ORAL
Qty: 21 EACH | Refills: 0 | Status: SHIPPED | OUTPATIENT
Start: 2023-01-05 | End: 2023-12-18 | Stop reason: ALTCHOICE

## 2023-01-05 RX ORDER — DEXAMETHASONE SODIUM PHOSPHATE 4 MG/ML
4 INJECTION, SOLUTION INTRA-ARTICULAR; INTRALESIONAL; INTRAMUSCULAR; INTRAVENOUS; SOFT TISSUE
Status: COMPLETED | OUTPATIENT
Start: 2023-01-05 | End: 2023-01-05

## 2023-01-05 RX ORDER — PHENYLEPHRINE HCL 10 MG/1
10 TABLET, FILM COATED ORAL EVERY 4 HOURS PRN
COMMUNITY

## 2023-01-05 RX ADMIN — DEXAMETHASONE SODIUM PHOSPHATE 4 MG: 4 INJECTION, SOLUTION INTRA-ARTICULAR; INTRALESIONAL; INTRAMUSCULAR; INTRAVENOUS; SOFT TISSUE at 12:01

## 2023-01-05 NOTE — PROGRESS NOTES
Patient ID: Garth Flores is a 47 y.o. male.    Chief Complaint: No chief complaint on file.    47-year-old male here today for requested visit.  Today presents with complaints of new upper respiratory symptoms-see form below.    URI   This is a new problem. The current episode started in the past 7 days. The problem has been gradually worsening. There has been no fever. Associated symptoms include congestion, coughing, headaches, rhinorrhea, sneezing, a sore throat and wheezing. Pertinent negatives include no chest pain. He has tried decongestant and antihistamine for the symptoms. The treatment provided mild relief.     MEDICAL HISTORY:    Past Medical History:   Diagnosis Date    Allergies     Anxiety disorder, unspecified     Depression     Hemochromatosis, unspecified     History of sexually transmitted disease     Mixed hyperlipidemia 10/3/2022      History reviewed. No pertinent surgical history.   Social History     Tobacco Use    Smoking status: Former     Packs/day: 0.50     Years: 17.00     Pack years: 8.50     Types: Cigarettes     Start date:      Quit date: 2016     Years since quittin.2    Smokeless tobacco: Never   Substance Use Topics    Alcohol use: Not Currently     Alcohol/week: 3.0 standard drinks     Types: 3 Shots of liquor per week    Drug use: Not Currently     Types: Marijuana          Health Maintenance Due   Topic Date Due    Hepatitis C Screening  Never done    Colorectal Cancer Screening  Never done          Patient Care Team:  Joellen Fournier MD as PCP - General (Internal Medicine)  Rodolfo Greene MD as Consulting Physician (Cardiology)  Kishor Rosales MD as Consulting Physician (Allergy)  Elliot Cho MD as Consulting Physician (Urology)      Review of Systems   Constitutional:  Positive for chills and fatigue. Negative for fever.   HENT:  Positive for congestion, postnasal drip, rhinorrhea, sneezing and sore throat.    Respiratory:  Positive for cough and  "wheezing. Negative for chest tightness.    Cardiovascular:  Negative for chest pain and palpitations.   Musculoskeletal:  Positive for arthralgias and myalgias.   Neurological:  Positive for headaches.     Objective:   /80 (BP Location: Right arm, Patient Position: Sitting)   Pulse 68   Resp 16   Ht 5' 6" (1.676 m)   Wt 84.8 kg (187 lb)   SpO2 97%   BMI 30.18 kg/m²      Physical Exam  Constitutional:       General: He is not in acute distress.     Appearance: Normal appearance.   HENT:      Right Ear: Tympanic membrane, ear canal and external ear normal.      Left Ear: Tympanic membrane, ear canal and external ear normal.      Nose: Nose normal.      Mouth/Throat:      Mouth: Mucous membranes are moist.      Pharynx: Oropharynx is clear.   Eyes:      Extraocular Movements: Extraocular movements intact.      Conjunctiva/sclera: Conjunctivae normal.      Pupils: Pupils are equal, round, and reactive to light.   Cardiovascular:      Rate and Rhythm: Normal rate and regular rhythm.      Pulses: Normal pulses.      Heart sounds: Normal heart sounds. No murmur heard.    No gallop.   Pulmonary:      Effort: Pulmonary effort is normal.      Breath sounds: Wheezing present.   Abdominal:      General: Bowel sounds are normal. There is no distension.      Palpations: Abdomen is soft. There is no mass.      Tenderness: There is no abdominal tenderness. There is no guarding.   Musculoskeletal:         General: Normal range of motion.   Skin:     General: Skin is warm and dry.   Neurological:      Mental Status: He is alert. Mental status is at baseline.      Sensory: No sensory deficit.      Motor: No weakness.         Assessment:       ICD-10-CM ICD-9-CM   1. Upper respiratory tract infection, unspecified type  J06.9 465.9   2. Wheezing  R06.2 786.07        Plan:     Problem List Items Addressed This Visit          ENT    Upper respiratory tract infection - Primary     -obtain COVID/RSV/flu swab in office "   -consider initiating antibiotics pending results of in office swab   -encourage utilize OTC Claritin D/Zyrtec D/Allegra D  -okay to add fluticasone nasal   -okay to utilize OTC Delsym p.r.n. cough   -incorporate vitamin-C, vitamin-D, and zinc         Relevant Medications    methylPREDNISolone (MEDROL DOSEPACK) 4 mg tablet    Other Relevant Orders    COVID/RSV/FLU A&B PCR (Completed)       Pulmonary    Wheezing     -no prior history of smoking, asthma, or COPD , likely related to current URI  -IM dexamethasone, Medrol Dosepak  -initiate albuterol inhaler p.r.n.         Relevant Medications    methylPREDNISolone (MEDROL DOSEPACK) 4 mg tablet    Other Relevant Orders    COVID/RSV/FLU A&B PCR (Completed)          Follow up for Wellness with labs prior to visit.   -plan specifics discussed above    Orders Placed This Encounter    COVID/RSV/FLU A&B PCR    methylPREDNISolone (MEDROL DOSEPACK) 4 mg tablet    dexAMETHasone injection 4 mg        Medication List with Changes/Refills   New Medications    ALBUTEROL (PROVENTIL/VENTOLIN HFA) 90 MCG/ACTUATION INHALER    INHALE 2 PUFFS INTO THE LUNGS EVERY 6 (SIX) HOURS AS NEEDED FOR WHEEZING. RESCUE    AZITHROMYCIN (Z-YAMILET) 250 MG TABLET    Take 2 tablets by mouth on day 1; Take 1 tablet by mouth on days 2-5    METHYLPREDNISOLONE (MEDROL DOSEPACK) 4 MG TABLET    use as directed   Current Medications    AMOXICILLIN (AMOXIL) 500 MG CAPSULE    Take 1 capsule by mouth 2 (two) times daily.    CETIRIZINE (ZYRTEC) 10 MG CAP    Take 1 tablet by mouth Daily.    CLARITHROMYCIN (BIAXIN) 500 MG TABLET    Take 500 mg by mouth 2 (two) times daily.    GUAIFENESIN (MUCINEX) 600 MG 12 HR TABLET    Take 1,200 mg by mouth 2 (two) times daily.    METRONIDAZOLE (FLAGYL) 500 MG TABLET    Take 500 mg by mouth 2 (two) times daily.    OMEPRAZOLE (PRILOSEC) 20 MG CAPSULE    Take 20 mg by mouth 2 (two) times daily.    PHENYLEPHRINE (SUDAFED PE) 10 MG TAB    Take 10 mg by mouth every 4 (four) hours as  needed.    VORTIOXETINE (TRINTELLIX) 10 MG TAB    Take 15 mg by mouth Daily.

## 2023-01-06 NOTE — ASSESSMENT & PLAN NOTE
-no prior history of smoking, asthma, or COPD , likely related to current URI  -IM dexamethasone, Medrol Dosepak  -initiate albuterol inhaler p.r.n.

## 2023-01-06 NOTE — TELEPHONE ENCOUNTER
Recently obtain COVID/RSV/flu viral swab resulted all negative.  As discussed visit, we will send Z-Kwesi since likely bacterial.

## 2023-01-06 NOTE — ASSESSMENT & PLAN NOTE
-obtain COVID/RSV/flu swab in office   -consider initiating antibiotics pending results of in office swab   -encourage utilize OTC Claritin D/Zyrtec D/Allegra D  -okay to add fluticasone nasal   -okay to utilize OTC Delsym p.r.n. cough   -incorporate vitamin-C, vitamin-D, and zinc

## 2023-01-09 NOTE — TELEPHONE ENCOUNTER
Spoke with the patient.  He is aware of the test results.  He picked up the Zpack on Friday.  He will call if he does not improve.

## 2023-01-30 ENCOUNTER — PATIENT MESSAGE (OUTPATIENT)
Dept: ADMINISTRATIVE | Facility: HOSPITAL | Age: 48
End: 2023-01-30
Payer: COMMERCIAL

## 2023-02-13 ENCOUNTER — TELEPHONE (OUTPATIENT)
Dept: INTERNAL MEDICINE | Facility: CLINIC | Age: 48
End: 2023-02-13
Payer: COMMERCIAL

## 2023-02-13 DIAGNOSIS — F41.9 ANXIETY AND DEPRESSION: Primary | ICD-10-CM

## 2023-02-13 DIAGNOSIS — F32.A ANXIETY AND DEPRESSION: Primary | ICD-10-CM

## 2023-02-13 LAB — RH BLD: NORMAL

## 2023-02-13 RX ORDER — VORTIOXETINE 5 MG/1
5 TABLET, FILM COATED ORAL DAILY
Qty: 90 TABLET | Refills: 1 | Status: SHIPPED | OUTPATIENT
Start: 2023-02-13 | End: 2023-07-18 | Stop reason: SDUPTHER

## 2023-02-13 NOTE — TELEPHONE ENCOUNTER
----- Message from Nidia Chau sent at 2/13/2023  3:44 PM CST -----  Regarding: Medication Refill  Pt called and stated he needs a refill on his Trentilix 10mg and Trentilix 5mg, states [facility] handles this but wants Dr. Fournier to take over filling it asked what do he have to do to make this happen pharmacy [facility] choice is [facility] on [address] .. please advise     [phone] Garth

## 2023-02-13 NOTE — TELEPHONE ENCOUNTER
Sent in Pt Trintellix. Pt has been out for a few days. Please advise if this is a medication that you will take on prescribing. Thank you

## 2023-03-13 PROBLEM — E78.2 MIXED HYPERLIPIDEMIA: Chronic | Status: ACTIVE | Noted: 2022-10-03

## 2023-03-20 LAB — CRC RECOMMENDATION EXT: NORMAL

## 2023-03-22 ENCOUNTER — PATIENT OUTREACH (OUTPATIENT)
Dept: ADMINISTRATIVE | Facility: HOSPITAL | Age: 48
End: 2023-03-22
Payer: COMMERCIAL

## 2023-03-22 NOTE — PROGRESS NOTES
Population Health Outreach. The following record(s)  below were uploaded for Health Maintenance .    COLONOSCOPY     03/20/23

## 2023-03-27 ENCOUNTER — TELEPHONE (OUTPATIENT)
Dept: ADMINISTRATIVE | Facility: HOSPITAL | Age: 48
End: 2023-03-27
Payer: COMMERCIAL

## 2023-03-27 NOTE — TELEPHONE ENCOUNTER
----- Message from Stephanie Lau MA sent at 3/23/2023 12:09 PM CDT -----  Regarding: DR LUIS GAMINO Monday 4-3-23       1. Are there any outstanding Labs, imaging or referrals in the patient's chart?      6 month follow up for H Pylori    No labs required    Last wellness 10-3-22           2. . Has the patient been seen in an ER, urgent care clinic, or any other health care    provider since their last visit? If yes when and where?

## 2023-07-18 ENCOUNTER — TELEPHONE (OUTPATIENT)
Dept: INTERNAL MEDICINE | Facility: CLINIC | Age: 48
End: 2023-07-18
Payer: COMMERCIAL

## 2023-07-18 DIAGNOSIS — F32.A ANXIETY AND DEPRESSION: ICD-10-CM

## 2023-07-18 DIAGNOSIS — F41.9 ANXIETY AND DEPRESSION: ICD-10-CM

## 2023-07-18 RX ORDER — VORTIOXETINE 5 MG/1
5 TABLET, FILM COATED ORAL DAILY
Qty: 90 TABLET | Refills: 6 | Status: SHIPPED | OUTPATIENT
Start: 2023-07-18 | End: 2024-01-19 | Stop reason: SDUPTHER

## 2023-07-18 NOTE — TELEPHONE ENCOUNTER
----- Message from Indiana Hurst sent at 7/18/2023 10:43 AM CDT -----  Regarding: refill  Type:  RX Refill Request    Who Called: pt  Refill or New Rx:refill    RX Name and Strength:vortioxetine (TRINTELLIX) 5 mg Tab  How is the patient currently taking it? (ex. 1XDay):1xday  Is this a 30 day or 90 day RX:90    RX Name and Strength:vortioxetine (TRINTELLIX) 10 mg Tab  How is the patient currently taking it? (ex. 1XDay):1xday  Is this a 30 day or 90 day RX:90    [facility] [address]  Local or Mail Order:local  Ordering Provider:katelin carmona  Would the patient rather a call back or a response via [facility]?   Best Call Back Number:[phone]  Additional Information: pt called about needing a refill on medication

## 2023-08-02 ENCOUNTER — TELEPHONE (OUTPATIENT)
Dept: INTERNAL MEDICINE | Facility: CLINIC | Age: 48
End: 2023-08-02
Payer: COMMERCIAL

## 2023-09-26 ENCOUNTER — TELEPHONE (OUTPATIENT)
Dept: INTERNAL MEDICINE | Facility: CLINIC | Age: 48
End: 2023-09-26
Payer: COMMERCIAL

## 2023-09-26 NOTE — TELEPHONE ENCOUNTER
----- Message from Nidia Chau sent at 9/26/2023 11:17 AM CDT -----  Regarding: Labs  Pt called and stated for his visit 10/09 no orders needed he will do the same labs he did last year for his job wellness he already has it  and will bring results is this ok..Also will have revision sent over some scans he had done elmer     [phone] Garth

## 2023-10-05 ENCOUNTER — TELEPHONE (OUTPATIENT)
Dept: INTERNAL MEDICINE | Facility: CLINIC | Age: 48
End: 2023-10-05
Payer: COMMERCIAL

## 2023-10-05 DIAGNOSIS — Z00.00 WELLNESS EXAMINATION: Primary | ICD-10-CM

## 2023-10-05 DIAGNOSIS — Z12.5 SCREENING FOR PROSTATE CANCER: ICD-10-CM

## 2023-10-05 NOTE — TELEPHONE ENCOUNTER
----- Message from Shelby Ferrari sent at 10/5/2023 11:56 AM CDT -----  Regarding: labs  Patient has wellness appt on 10/9 and no lab orders in epic

## 2023-10-05 NOTE — TELEPHONE ENCOUNTER
----- Message from Jazmine Jones sent at 10/5/2023 12:17 PM CDT -----  .Caller is requesting to schedule their Lab appointment prior to annual appointment.  Order is not listed in EPIC.  Please enter order and contact patient to schedule.    Name of Caller: pt      Preferred Date and Time of Labs: 10-5-23    Date of EPP Appointment: 10-09-23    Where would they like the lab performed? [facility] on [facility] [phone]    Would the patient rather a call back? Yes      Best Call Back Number: [phone]    Additional Information: please send orders thanks

## 2023-11-20 DIAGNOSIS — Z00.00 GENERAL MEDICAL EXAM: Primary | ICD-10-CM

## 2023-11-21 DIAGNOSIS — Z00.00 GENERAL MEDICAL EXAM: Primary | ICD-10-CM

## 2023-12-05 ENCOUNTER — TELEPHONE (OUTPATIENT)
Dept: INTERNAL MEDICINE | Facility: CLINIC | Age: 48
End: 2023-12-05
Payer: COMMERCIAL

## 2023-12-05 NOTE — TELEPHONE ENCOUNTER
Pt did go to the walk in clinic, it was stated Pt has an URI, no medications were prescribed. Pt picked up Robitussin and will try that for his cough. Pt was told to treat with OTC medication, since [facility] did not decide he required any prescriptions. Pt did say he no longer has a fever. Pt will call back is the issue persist.

## 2023-12-05 NOTE — TELEPHONE ENCOUNTER
----- Message from Alina Jones sent at 12/5/2023  9:30 AM CST -----  .Type:  Needs Medical Advice    Who Called: pt  Symptoms (please be specific):    How long has patient had these symptoms:    Pharmacy name and phone #:    Would the patient rather a call back or a response via [facility]? cb  Best Call Back Number: [phone]  Additional Information: pt has upper respiratory infection and asking for nurse for advice

## 2023-12-12 ENCOUNTER — TELEPHONE (OUTPATIENT)
Dept: INTERNAL MEDICINE | Facility: CLINIC | Age: 48
End: 2023-12-12
Payer: COMMERCIAL

## 2023-12-12 NOTE — TELEPHONE ENCOUNTER
----- Message from Praful Myers sent at 12/12/2023 12:18 PM CST -----  .Type:  Patient Returning Call    Who Called:pt   Who Left Message for Patient:  Does the patient know what this is regarding?:  Would the patient rather a call back or a response via [facility]? Call back   Best Call Back Number:[phone]  Additional Information: pt is calling about his upper resp infection   Called the back line no answer

## 2023-12-18 ENCOUNTER — CLINICAL SUPPORT (OUTPATIENT)
Dept: INTERNAL MEDICINE | Facility: CLINIC | Age: 48
End: 2023-12-18
Payer: COMMERCIAL

## 2023-12-18 VITALS
OXYGEN SATURATION: 97 % | HEART RATE: 68 BPM | HEIGHT: 66 IN | DIASTOLIC BLOOD PRESSURE: 86 MMHG | TEMPERATURE: 99 F | BODY MASS INDEX: 30.18 KG/M2 | SYSTOLIC BLOOD PRESSURE: 126 MMHG

## 2023-12-18 DIAGNOSIS — R73.9 HYPERGLYCEMIA: ICD-10-CM

## 2023-12-18 DIAGNOSIS — E83.110 HEREDITARY HEMOCHROMATOSIS: ICD-10-CM

## 2023-12-18 DIAGNOSIS — E83.110 HEREDITARY HEMOCHROMATOSIS: Primary | ICD-10-CM

## 2023-12-18 DIAGNOSIS — Z00.00 GENERAL MEDICAL EXAM: Primary | ICD-10-CM

## 2023-12-18 DIAGNOSIS — Z23 NEED FOR VACCINATION: ICD-10-CM

## 2023-12-18 DIAGNOSIS — J01.00 ACUTE NON-RECURRENT MAXILLARY SINUSITIS: ICD-10-CM

## 2023-12-18 PROBLEM — R06.2 WHEEZING: Status: RESOLVED | Noted: 2023-01-05 | Resolved: 2023-12-18

## 2023-12-18 PROBLEM — Z86.19 HISTORY OF GONORRHEA: Status: RESOLVED | Noted: 2022-10-03 | Resolved: 2023-12-18

## 2023-12-18 PROBLEM — J06.9 UPPER RESPIRATORY TRACT INFECTION: Status: RESOLVED | Noted: 2023-01-05 | Resolved: 2023-12-18

## 2023-12-18 LAB
(HCYS)2 SERPL-MCNC: 7.1 UMOL/L (ref 5.1–15.4)
ALBUMIN SERPL-MCNC: 3.9 G/DL (ref 3.5–5)
ALBUMIN/GLOB SERPL: 1.2 RATIO (ref 1.1–2)
ALP SERPL-CCNC: 77 UNIT/L (ref 40–150)
ALT SERPL-CCNC: 33 UNIT/L (ref 0–55)
APPEARANCE UR: CLEAR
AST SERPL-CCNC: 23 UNIT/L (ref 5–34)
BACTERIA #/AREA URNS AUTO: ABNORMAL /HPF
BASOPHILS # BLD AUTO: 0.04 X10(3)/MCL
BASOPHILS NFR BLD AUTO: 0.8 %
BILIRUB SERPL-MCNC: 0.3 MG/DL
BILIRUB UR QL STRIP.AUTO: NEGATIVE
BUN SERPL-MCNC: 18.2 MG/DL (ref 8.4–25.7)
CALCIUM SERPL-MCNC: 9.2 MG/DL (ref 8.4–10.2)
CHLORIDE SERPL-SCNC: 106 MMOL/L (ref 98–107)
CHOLEST SERPL-MCNC: 215 MG/DL
CHOLEST/HDLC SERPL: 5 {RATIO} (ref 0–5)
CK SERPL-CCNC: 130 U/L (ref 30–200)
CO2 SERPL-SCNC: 25 MMOL/L (ref 22–29)
COLOR UR AUTO: COLORLESS
CREAT SERPL-MCNC: 1.08 MG/DL (ref 0.73–1.18)
CRP SERPL HS-MCNC: 1.24 MG/L
DEPRECATED CALCIDIOL+CALCIFEROL SERPL-MC: 39.8 NG/ML (ref 30–80)
DLCO: NORMAL
EOSINOPHIL # BLD AUTO: 0.2 X10(3)/MCL (ref 0–0.9)
EOSINOPHIL NFR BLD AUTO: 4.2 %
ERYTHROCYTE [DISTWIDTH] IN BLOOD BY AUTOMATED COUNT: 14.9 % (ref 11.5–17)
EST. AVERAGE GLUCOSE BLD GHB EST-MCNC: 125.5 MG/DL
FERRITIN SERPL-MCNC: 341.83 NG/ML (ref 21.81–274.66)
FEV1/FVC: 77.1 %
FEV1: 3.81 LITERS
FVC: 4.94 LITERS
GFR SERPLBLD CREATININE-BSD FMLA CKD-EPI: >60 MLS/MIN/1.73/M2
GLOBULIN SER-MCNC: 3.2 GM/DL (ref 2.4–3.5)
GLUCOSE SERPL-MCNC: 116 MG/DL (ref 74–100)
GLUCOSE UR QL STRIP.AUTO: NORMAL
HBA1C MFR BLD: 6 %
HCT VFR BLD AUTO: 41.3 % (ref 42–52)
HCV AB SERPL QL IA: NONREACTIVE
HDLC SERPL-MCNC: 40 MG/DL (ref 35–60)
HGB BLD-MCNC: 15 G/DL (ref 14–18)
HIV 1+2 AB+HIV1 P24 AG SERPL QL IA: NONREACTIVE
HYALINE CASTS #/AREA URNS LPF: ABNORMAL /LPF
IMM GRANULOCYTES # BLD AUTO: 0.01 X10(3)/MCL (ref 0–0.04)
IMM GRANULOCYTES NFR BLD AUTO: 0.2 %
KETONES UR QL STRIP.AUTO: NEGATIVE
LDLC SERPL CALC-MCNC: 145 MG/DL (ref 50–140)
LEUKOCYTE ESTERASE UR QL STRIP.AUTO: 25
LYMPHOCYTES # BLD AUTO: 1.98 X10(3)/MCL (ref 0.6–4.6)
LYMPHOCYTES NFR BLD AUTO: 41.9 %
MCH RBC QN AUTO: 32.5 PG (ref 27–31)
MCHC RBC AUTO-ENTMCNC: 36.3 G/DL (ref 33–36)
MCV RBC AUTO: 89.4 FL (ref 80–94)
MONOCYTES # BLD AUTO: 0.47 X10(3)/MCL (ref 0.1–1.3)
MONOCYTES NFR BLD AUTO: 10 %
NEUTROPHILS # BLD AUTO: 2.02 X10(3)/MCL (ref 2.1–9.2)
NEUTROPHILS NFR BLD AUTO: 42.9 %
NITRITE UR QL STRIP.AUTO: NEGATIVE
NRBC BLD AUTO-RTO: 0 %
PH UR STRIP.AUTO: 5.5 [PH]
PLATELET # BLD AUTO: 313 X10(3)/MCL (ref 130–400)
PMV BLD AUTO: 9.7 FL (ref 7.4–10.4)
POTASSIUM SERPL-SCNC: 4.6 MMOL/L (ref 3.5–5.1)
PROT SERPL-MCNC: 7.1 GM/DL (ref 6.4–8.3)
PROT UR QL STRIP.AUTO: NEGATIVE
PSA SERPL-MCNC: 0.93 NG/ML
RBC # BLD AUTO: 4.62 X10(6)/MCL (ref 4.7–6.1)
RBC #/AREA URNS AUTO: ABNORMAL /HPF
RBC UR QL AUTO: NEGATIVE
SODIUM SERPL-SCNC: 138 MMOL/L (ref 136–145)
SP GR UR STRIP.AUTO: 1.01 (ref 1–1.03)
SQUAMOUS #/AREA URNS LPF: ABNORMAL /HPF
T3FREE SERPL-MCNC: 2.81 PG/ML (ref 1.58–3.91)
T4 FREE SERPL-MCNC: 0.89 NG/DL (ref 0.7–1.48)
TESTOST SERPL-MCNC: 585.48 NG/DL (ref 220.91–715.81)
TLC: NORMAL
TRIGL SERPL-MCNC: 151 MG/DL (ref 34–140)
TSH SERPL-ACNC: 1.18 UIU/ML (ref 0.35–4.94)
URATE SERPL-MCNC: 5.6 MG/DL (ref 3.5–7.2)
UROBILINOGEN UR STRIP-ACNC: NORMAL
VLDLC SERPL CALC-MCNC: 30 MG/DL
WBC # SPEC AUTO: 4.72 X10(3)/MCL (ref 4.5–11.5)
WBC #/AREA URNS AUTO: ABNORMAL /HPF

## 2023-12-18 PROCEDURE — 86803 HEPATITIS C AB TEST: CPT

## 2023-12-18 PROCEDURE — 92551 PURE TONE HEARING TEST AIR: CPT | Mod: ,,, | Performed by: FAMILY MEDICINE

## 2023-12-18 PROCEDURE — 83090 ASSAY OF HOMOCYSTEINE: CPT

## 2023-12-18 PROCEDURE — 93005 EKG 12-LEAD: ICD-10-PCS | Mod: ,,, | Performed by: FAMILY MEDICINE

## 2023-12-18 PROCEDURE — 99172 OCULAR FUNCTION SCREEN: CPT | Mod: ,,, | Performed by: FAMILY MEDICINE

## 2023-12-18 PROCEDURE — 84403 ASSAY OF TOTAL TESTOSTERONE: CPT

## 2023-12-18 PROCEDURE — 94010 BREATHING CAPACITY TEST: ICD-10-PCS | Mod: ,,, | Performed by: FAMILY MEDICINE

## 2023-12-18 PROCEDURE — 93005 ELECTROCARDIOGRAM TRACING: CPT | Mod: ,,, | Performed by: FAMILY MEDICINE

## 2023-12-18 PROCEDURE — 82306 VITAMIN D 25 HYDROXY: CPT

## 2023-12-18 PROCEDURE — 99499 UNLISTED E&M SERVICE: CPT | Mod: ,,, | Performed by: FAMILY MEDICINE

## 2023-12-18 PROCEDURE — 84443 ASSAY THYROID STIM HORMONE: CPT

## 2023-12-18 PROCEDURE — 84439 ASSAY OF FREE THYROXINE: CPT

## 2023-12-18 PROCEDURE — 36415 PR COLLECTION VENOUS BLOOD,VENIPUNCTURE: ICD-10-PCS | Mod: ,,, | Performed by: FAMILY MEDICINE

## 2023-12-18 PROCEDURE — 0358T PR BIOELECTRICAL IMPEDANCE WHOLE BODY COMPOSITION ASSESSMENT W/I&R: ICD-10-PCS | Mod: ,,, | Performed by: FAMILY MEDICINE

## 2023-12-18 PROCEDURE — 87389 HIV-1 AG W/HIV-1&-2 AB AG IA: CPT

## 2023-12-18 PROCEDURE — 82728 ASSAY OF FERRITIN: CPT

## 2023-12-18 PROCEDURE — 36415 COLL VENOUS BLD VENIPUNCTURE: CPT | Mod: ,,, | Performed by: FAMILY MEDICINE

## 2023-12-18 PROCEDURE — 84550 ASSAY OF BLOOD/URIC ACID: CPT

## 2023-12-18 PROCEDURE — 82550 ASSAY OF CK (CPK): CPT

## 2023-12-18 PROCEDURE — 99396 PR PREVENTIVE VISIT,EST,40-64: ICD-10-PCS | Mod: ,,, | Performed by: FAMILY MEDICINE

## 2023-12-18 PROCEDURE — 85025 COMPLETE CBC W/AUTO DIFF WBC: CPT

## 2023-12-18 PROCEDURE — 93010 EKG 12-LEAD: ICD-10-PCS | Mod: ,,, | Performed by: FAMILY MEDICINE

## 2023-12-18 PROCEDURE — 99499 PR EXECUTIVE HEALTH VISIT WITH INSURANCE: ICD-10-PCS | Mod: ,,, | Performed by: FAMILY MEDICINE

## 2023-12-18 PROCEDURE — 81001 URINALYSIS AUTO W/SCOPE: CPT

## 2023-12-18 PROCEDURE — 80061 LIPID PANEL: CPT

## 2023-12-18 PROCEDURE — 83036 HEMOGLOBIN GLYCOSYLATED A1C: CPT

## 2023-12-18 PROCEDURE — 84153 ASSAY OF PSA TOTAL: CPT

## 2023-12-18 PROCEDURE — 99172 VISUAL SCREEN, AUTOMATED W/COLOR VISION: ICD-10-PCS | Mod: ,,, | Performed by: FAMILY MEDICINE

## 2023-12-18 PROCEDURE — 84481 FREE ASSAY (FT-3): CPT

## 2023-12-18 PROCEDURE — 86141 C-REACTIVE PROTEIN HS: CPT

## 2023-12-18 PROCEDURE — 92551 HEARING SCREENING: ICD-10-PCS | Mod: ,,, | Performed by: FAMILY MEDICINE

## 2023-12-18 PROCEDURE — 0358T BIA WHOLE BODY: CPT | Mod: ,,, | Performed by: FAMILY MEDICINE

## 2023-12-18 PROCEDURE — 93010 ELECTROCARDIOGRAM REPORT: CPT | Mod: ,,, | Performed by: FAMILY MEDICINE

## 2023-12-18 PROCEDURE — 80053 COMPREHEN METABOLIC PANEL: CPT

## 2023-12-18 PROCEDURE — 99396 PREV VISIT EST AGE 40-64: CPT | Mod: ,,, | Performed by: FAMILY MEDICINE

## 2023-12-18 PROCEDURE — 94010 BREATHING CAPACITY TEST: CPT | Mod: ,,, | Performed by: FAMILY MEDICINE

## 2023-12-18 RX ORDER — AMOXICILLIN AND CLAVULANATE POTASSIUM 875; 125 MG/1; MG/1
1 TABLET, FILM COATED ORAL EVERY 12 HOURS
Qty: 14 TABLET | Refills: 0 | Status: SHIPPED | OUTPATIENT
Start: 2023-12-18 | End: 2023-12-25

## 2023-12-18 RX ORDER — ZOSTER VACCINE RECOMBINANT, ADJUVANTED 50 MCG/0.5
0.5 KIT INTRAMUSCULAR ONCE
Qty: 1 EACH | Refills: 0 | Status: SHIPPED | OUTPATIENT
Start: 2023-12-18 | End: 2023-12-18

## 2023-12-18 NOTE — ASSESSMENT & PLAN NOTE
Follow a low carbohydrate, heart healthy diet. Work towards weight loss and continue current exercise routine. Repeat lab testing in 3-6 months with your primary care physician.

## 2023-12-18 NOTE — PROGRESS NOTES
[facility]  Jenny Lopez MD      Subjective      Garth Flores is a 48 y.o. year old male, who presents today for a preventive medical evaluation.      Fr. Garth Flores reports since his last [facility] physical he has had no major illnesses or injuries.   He reports that 3.5 weeks ago he began with an upper respiratory infection and was seen in a walk in clinic. He was treated conservately with no antibiotics. He states that since that time his symptoms have not improved. He has bilateral maxillary sinus tenderness, nasal congestion, consistent productive cough and some fatigue. He denies any fevers, chills, chest pain, shortness of breath, wheezing or sore throat.     CURRENT MEDICAL PROBLEMS     Father Sandra reports having depression and anxiety for which he take Trintellix. This was first diagnosed in 2007. He also reports having seasonal allergies diagnosed in 2006 which is controlled with Zyrtec when needed.      He reports that he has been diagnosed with hemachromotosis, or perhaps is a carrier. He is unsure. He received 2 therapeutic phelebotmy since 2019. His last one was in 2021.   He has a past history of mono-hepatitis his last liver ultrasound was done in 2021 at [facility]. He reports that they found a lesion on this ultrasound and was told it was not too concerning.     Current Medications    Current Outpatient Medications:     albuterol (PROVENTIL/VENTOLIN HFA) 90 mcg/actuation inhaler, INHALE 2 PUFFS INTO THE LUNGS EVERY 6 (SIX) HOURS AS NEEDED FOR WHEEZING. RESCUE, Disp: 18 g, Rfl: 3    cetirizine (ZYRTEC) 10 mg Cap, Take 1 tablet by mouth Daily., Disp: , Rfl:     guaiFENesin (MUCINEX) 600 mg 12 hr tablet, Take 1,200 mg by mouth 2 (two) times daily., Disp: , Rfl:     phenylephrine (SUDAFED PE) 10 MG Tab, Take 10 mg by mouth every 4 (four) hours as needed., Disp: , Rfl:     vortioxetine (TRINTELLIX) 10 mg Tab, Take 1 tablet (10 mg total) by mouth Daily., Disp: 90  tablet, Rfl: 6    vortioxetine (TRINTELLIX) 5 mg Tab, Take 1 tablet (5 mg total) by mouth Daily., Disp: 90 tablet, Rfl: 6    amoxicillin-clavulanate 875-125mg (AUGMENTIN) 875-125 mg per tablet, Take 1 tablet by mouth every 12 (twelve) hours. for 7 days, Disp: 14 tablet, Rfl: 0    diphth,pertus,acell,,tetanus (BOOSTRIX) 2.5-8-5 Lf-mcg-Lf/0.5mL Susp, Inject 0.5 mLs into the muscle once. for 1 dose, Disp: 0.5 mL, Rfl: 0    varicella-zoster [id number]-AS01B, PF, (SHINGRIX, PF,) 50 mcg/0.5 mL injection, Inject 0.5 mLs into the muscle once. for 1 dose, Disp: 1 each, Rfl: 0     Care Team 274}  Patient Care Team:  Joellen Fournier MD as PCP - General (Internal Medicine)  [facility]  Rodolfo Greene MD as Consulting Physician (Cardiology)  Kishor Rosales MD as Consulting Physician (Allergy)  Elliot Cho MD as Consulting Physician (Urology)  Madalyn Munoz LPN as Care Coordinator  Isacc Manriquez MD as Consulting Physician (Gastroenterology)  Romeo Alvarez, MICK (Chiropractic Medicine)    Eye: Lens Bjfter      PAST MEDICAL HISTORY   274}  Immunizations:  He has his tetanus booster in the last 10 years.  He has received the annual flu vaccine.  He reports he has taken both Hepatitis A and B vaccines.  He has not taken his Shingles vaccine.   He  has taken his Covid Vaccine.     Drug Allergies:274}  Review of patient's allergies indicates:  No Known Allergies     Medical Illnesses:  See Current Medical Problems and Review of System.    Surgical Illnesses:   Past Surgical History:   Procedure Laterality Date    COLONOSCOPY  03/20/2023    JIMY Manriquez MD/ f/u 10 years        Diagnostic Studies (year completed):  EKG, 2022  Treadmill Stress Test, 2022  Nuclear Heart Scan, 2022  Echocardiogram, 2022  Carotid Ultrasound, 2022  CT Ca+, 2022 (Score 0)  Abdominal/Pelvic Ultrasound, 2020  Colonoscopy, 2023 (next due 2033)      SOCIAL HISTORY     Mr. Garth Flores is a  with  [employer]., He lives alone near his [employer].     Personal Habits  He does not smoke or use tobacco products. He has a past history of tobacco use. He smoked cigarettes for 17 years (6-10cigs/day. He stopped in 2016. He has a 9 pack year history.  He drinks approximately 4 alcoholic beverages per week. He drinks approximately 15 caffeinated beverages per week. He reports that he is currently following a regular diet. Nearly all of his meals are prepared at home and eats out 15 times per week. He drinks 25 glasses of water a week. On average, he obtains 7 hours of sleep each night.     Exercise History  Currently, he exercises on average 5 days a week for 45 minutes each time. For aerobic activity he does stair climbers or stationary bike as well as outdoor walking, 3 days per week. For resistance training he does moderate intensity weight training and resistance bands, 5 days per week.     Stress Factors  The patient considers his home life to be low stress and his occupation to be of high stress. His greatest source of worry or concern is his job. He feels that he manages his stress fairly well most of the time. He rates his emotional outlook on life as generally happy. He rates his overall health as good.    FAMILY HISTORY     The patient's father  at the age of 74 and had the following medical issues: diabetes, multiple myeloma, quadruple coronary bypass surgery and obesity. His mother also  at the age of 74 from a stroke. She also had morbid obesity and varicose veins. He has 2 siblings, ages 63 and 60 who both have morbid obesity, diabetes and varicose veins.     REVIEW OF SYSTEMS     Review of Systems   Constitutional: No fever, no chills, no sweats, no weakness, no fatigue  Eye: No recent visual problems, no blurry vision, no visual disturbances  ENT: Negative except for HPI  Respiratory: Negative except for HPI.   Cardiovascular: No chest pain, no palpitations, no peripheral edema, no  "calf pain or swelling  Gastrointestinal: No nausea, no vomiting, no diarrhea, no constipation, no abdominal pain, no melena or rectal bleeding  Male : + occasional difficulty with urine stream, strength or flow rate-in morning. No polyuria, no nocturia, no sexual problems  Genitourinary: No dysuria, no hematuria, no excessive urination, no frequent UTIs, no urinary incontinence  Immunologic: No recurrent fevers, no malaise  Hematology/lymphatics: No swollen lymph glands  Musculoskeletal: No joint pain, no joint swelling, no trouble with gait.   Integumentary: No rashes, no skin lesions  Neurologic: Alert, oriented ×4, no abnormal balance, no confusion, no numbness, no tingling, no headache  Psychiatric: No anxiety, no depression, no sleeping problems          Objective     PHYSICAL EXAM   274}  /86 (BP Location: Left arm, Patient Position: Sitting)   Pulse 68   Temp 98.5 °F (36.9 °C)   Ht 5' 6" (1.676 m)   SpO2 97%   BMI 30.18 kg/m²     Physical Exam   General: Alert and oriented, no acute distress. well nourished, well kept, good hydration  Eye: Pupils are equal, round and reactive to light, extraocular movements are intact  HENT: Normocephalic, tympanic membranes are clear, oral mucosa is moist.   Ear: Tympanic membrane: Erythematous , no fluid in middle ear   Nose: Moderate amount of purulent discharge from sinuses, turbinates are boggy and erythematous.   Sinus: maxillary sinus, tenderness  Throat: Tonsils are not enlarged, no erythematous, no exudate; pharynx:+ Erythematous, nonedematous   Neck: Supple, nontender, no lymphadenopathy, no thyromegaly   Respiratory: Lungs are clear to auscultation, breath sounds are equal and symmetric   Cardiovascular: Normal rate, regular rhythm, no murmurs, no gallop, no edema  Gastrointestinal: Soft, nontender, nondistended, normal bowel sounds, no organomegaly  Genitourinary: No CVA tenderness  Musculoskeletal: Normal range of motion, no swelling, normal " gait.  Integumentary: Warm, dry, pink, no rash  Neurologic: Alert, oriented, no focal deficits, normal reflexes  Psychiatric: Cooperative, appropriate mood and affect, normal judgment, nonsuicidal    LABORATORY RESULTS    274}    Clinical Support on 12/18/2023   Component Date Value Ref Range Status    Uric Acid 12/18/2023 5.6  3.5 - 7.2 mg/dL Final    Color, UA 12/18/2023 Colorless (A)  Yellow, Light-Yellow, Dark Yellow, Minerva, Straw Final    Appearance, UA 12/18/2023 Clear  Clear Final    Specific Mathews, UA 12/18/2023 1.011  1.005 - 1.030 Final    pH, UA 12/18/2023 5.5  5.0 - 8.5 Final    Protein, UA 12/18/2023 Negative  Negative Final    Glucose, UA 12/18/2023 Normal  Negative, Normal Final    Ketones, UA 12/18/2023 Negative  Negative Final    Blood, UA 12/18/2023 Negative  Negative Final    Bilirubin, UA 12/18/2023 Negative  Negative Final    Urobilinogen, UA 12/18/2023 Normal  0.2, 1.0, Normal Final    Nitrites, UA 12/18/2023 Negative  Negative Final    Leukocyte Esterase, UA 12/18/2023 25 (A)  Negative Final    WBC, UA 12/18/2023 0-5  None Seen, 0-2, 3-5, 0-5 /HPF Final    Bacteria, UA 12/18/2023 None Seen  None Seen /HPF Final    Squamous Epithelial Cells, UA 12/18/2023 None Seen  None Seen /HPF Final    Hyaline Casts, UA 12/18/2023 None Seen  None Seen /lpf Final    RBC, UA 12/18/2023 0-5  None Seen, 0-2, 3-5, 0-5 /HPF Final    TSH 12/18/2023 1.184  0.350 - 4.940 uIU/mL Final    Testosterone Total 12/18/2023 585.48  220.91 - 715.81 ng/dL Final    Thyroxine Free 12/18/2023 0.89  0.70 - 1.48 ng/dL Final    T3 Free 12/18/2023 2.81  1.58 - 3.91 pg/mL Final    Prostate Specific Antigen 12/18/2023 0.93  <=4.00 ng/mL Final    Cholesterol Total 12/18/2023 215 (H)  <=200 mg/dL Final    HDL Cholesterol 12/18/2023 40  35 - 60 mg/dL Final    Triglyceride 12/18/2023 151 (H)  34 - 140 mg/dL Final    Cholesterol/HDL Ratio 12/18/2023 5  0 - 5 Final    Very Low Density Lipoprotein 12/18/2023 30    Final    LDL Cholesterol 12/18/2023 145.00 (H)  50.00 - 140.00 mg/dL Final    Homocysteine Total 12/18/2023 7.1  5.1 - 15.4 umol/L Final    HIV 12/18/2023 Nonreactive  Nonreactive Final    Hep C Ab Interp 12/18/2023 Nonreactive  Nonreactive Final    Hemoglobin A1c 12/18/2023 6.0  <=7.0 % Final    Estimated Average Glucose 12/18/2023 125.5  mg/dL Final    Ferritin Level 12/18/2023 341.83 (H)  21.81 - 274.66 ng/mL Final    Sodium Level 12/18/2023 138  136 - 145 mmol/L Final    Potassium Level 12/18/2023 4.6  3.5 - 5.1 mmol/L Final    Chloride 12/18/2023 106  98 - 107 mmol/L Final    Carbon Dioxide 12/18/2023 25  22 - 29 mmol/L Final    Glucose Level 12/18/2023 116 (H)  74 - 100 mg/dL Final    Blood Urea Nitrogen 12/18/2023 18.2  8.4 - 25.7 mg/dL Final    Creatinine 12/18/2023 1.08  0.73 - 1.18 mg/dL Final    Calcium Level Total 12/18/2023 9.2  8.4 - 10.2 mg/dL Final    Protein Total 12/18/2023 7.1  6.4 - 8.3 gm/dL Final    Albumin Level 12/18/2023 3.9  3.5 - 5.0 g/dL Final    Globulin 12/18/2023 3.2  2.4 - 3.5 gm/dL Final    Albumin/Globulin Ratio 12/18/2023 1.2  1.1 - 2.0 ratio Final    Bilirubin Total 12/18/2023 0.3  <=1.5 mg/dL Final    Alkaline Phosphatase 12/18/2023 77  40 - 150 unit/L Final    Alanine Aminotransferase 12/18/2023 33  0 - 55 unit/L Final    Aspartate Aminotransferase 12/18/2023 23  5 - 34 unit/L Final    eGFR 12/18/2023 >60  mls/min/1.73/m2 Final    Creatine Kinase 12/18/2023 130  30 - 200 U/L Final    WBC 12/18/2023 4.72  4.50 - 11.50 x10(3)/mcL Final    RBC 12/18/2023 4.62 (L)  4.70 - 6.10 x10(6)/mcL Final    Hgb 12/18/2023 15.0  14.0 - 18.0 g/dL Final    Hct 12/18/2023 41.3 (L)  42.0 - 52.0 % Final    MCV 12/18/2023 89.4  80.0 - 94.0 fL Final    MCH 12/18/2023 32.5 (H)  27.0 - 31.0 pg Final    MCHC 12/18/2023 36.3 (H)  33.0 - 36.0 g/dL Final    RDW 12/18/2023 14.9  11.5 - 17.0 % Final    Platelet 12/18/2023 313  130 - 400 x10(3)/mcL Final    MPV 12/18/2023  9.7  7.4 - 10.4 fL Final    Neut % 12/18/2023 42.9  % Final    Lymph % 12/18/2023 41.9  % Final    Mono % 12/18/2023 10.0  % Final    Eos % 12/18/2023 4.2  % Final    Basophil % 12/18/2023 0.8  % Final    Lymph # 12/18/2023 1.98  0.6 - 4.6 x10(3)/mcL Final    Neut # 12/18/2023 2.02 (L)  2.1 - 9.2 x10(3)/mcL Final    Mono # 12/18/2023 0.47  0.1 - 1.3 x10(3)/mcL Final    Eos # 12/18/2023 0.20  0 - 0.9 x10(3)/mcL Final    Baso # 12/18/2023 0.04  <=0.2 x10(3)/mcL Final    IG# 12/18/2023 0.01  0 - 0.04 x10(3)/mcL Final    IG% 12/18/2023 0.2  % Final    NRBC% 12/18/2023 0.0  % Final    FEV1 12/18/2023 3.81  liters Final    FVC 12/18/2023 4.94  liters Final    FEV1/FVC 12/18/2023 77.1  % Final    Vit D 25 OH 12/18/2023 39.8  30.0 - 80.0 ng/mL Final       Any results pending at the time of completion of note will be routed to the physician and patient for review and outreach performed if indicated.     IMAGING RESULTS     see official reports    Abdominal US--No significant abnormality.   Carotid US--No evidence of a hemodynamically significant carotid bifurcation stenosis.  0-49% stenosis bilateral internal carotid arteries.   Pelvic US--Unremarkable urinary bladder. Prostate volume of 16 mL.  Chest Xray--No acute cardiopulmonary abnormality identified     IN OFFICE TEST RESULTS     Vision/Auditory  Hearing Screening    500Hz 1000Hz 2000Hz 4000Hz   Right ear 25 25 25 25   Left ear 25 25 25 25     Vision Screening    Right eye Left eye Both eyes   Without correction   20/20   With correction           Spirometry   .2% predictive  FEV1 110.9% predictive  FEV1/FVC 99.2% predictive  Interpretation: Normal Spirometry    Resting ECG:  Rate 60 bpm; Normal Sinus Rhythm; Normal Axis  Impression: Resting electrocardiogram is probably normal. There are no significant changes when compared to the previous study dated 8/23/22.    Exercise Treadmill Stress Test  Deferred this year.       InBody Assessment:    12/18/2023 Previous results:    BMI: 19.9 BMI is elevated at 28.7  Percent body fat is elavated at 27.6%  Visceral Fat Area is normal at 95.5 cm2  Recommendation to lose 26lbs of fat mass.    Percent Body Fat: 28.6%    Visceral Fat Area: 104.3 cm2    Recommendations: Body fat mass loss of 30lbs          Assessment    IMPRESSION & PLAN    274}  Cancer Screening    Impression   All recommended cancer screening exams are up to date.    Plan Continue yearly wellness visits as planned.      Immunizations    Impression Not all recommended immunizations are up to date. These include: Shingles vaccine and Tdap   Plan A prescription for your vaccine was sent to your pharmacy on record.      Physical Exam    Impression Normal   Plan Repeat exam in 1 year or sooner if problems arise       Laboratory Results    Impression Normal except for:   Elevated fasting sugar  Elevated HbA1c test  Decreased RBC count  Increased ferritin   Plan Follow a low carb diet and work toward weight loss. Continue your exercise routine. Repeat HbA1c testing in 3-6 months.   Repeat CBC at next lab draw.  Repeat Ferritin at next lab draw. Could be acute phase reactant from illness and not due to HH.     Imaging Results    Impression Normal   *No liver lesion on Ab ultrasound.   Plan Repeat testing in 1 year.      Hearing Screening    Impression Normal   Plan Repeat in 1 year.     Vision Screening    Impression Normal   Plan Repeat in 1 year.     Lung Function Screening   Impression Normal   Plan Repeat in 1 year.     Electrocardiogram   Impression See Impression reported above.   Plan Repeat in 1 year.     Cardiovascular Risk Assessment   Impression ERWIN score: 2.2%   Low Risk   Plan Follow a heart healthy diet (see details below), maintain a healthy blood pressure (<140/80) and weight (BMI 25-29.9). Follow a regular aerobic exercise routine (see exercise prescription below)       In Body Assessment   Impression  See results and recommendations  "above   Plan Exercise Prescription         Type: Aerobic: walking, stationary cycling, aquatic exercise, running         Frequency: 3-5 sessions/week         Intensity: Moderate (RPE: 10-13)          Time: 30 minutes (150 minutes/week)              Plus         Type: Strength/Resistance Training: exercises using resistance bands, weight machines, handheld  weights or body weight         Frequency: 2-3 session/week               Plus         Type: Flexibility training: pre/post workout stretching, yoga, ariana chi         Frequency: 5-7 sessions/week         Time: 5-10 minutes       Plan Diet Recommendations:  Follow a heart healthy diet such as the Mediterranean-style diet.   Eat an overall healthy dietary pattern that emphasizes:   -a wide variety of fruits and vegetables   -whole grains and products made up mostly of whole grains   -healthy sources of protein (mostly plants such as legumes and nuts; fish and seafood; low fat or nonfat dairy; and, if you eat meat and poultry, ensuring it is lean and unprocessed)   -liquid non-tropical vegetable oils   -minimally processed foods   -minimized intake of added sugars   -food prepared with little or no salt   -limited or preferably no alcohol intake        TDEE: 2,607 calories  This is your "Total Daily Energy Expenditure" and is the amount of calories required to maintain current body weight when your activity level is factored in.    Recommended daily calorie intake to promote weight loss with moderate exercise (3-5 days/week): 2,107 calories/day    BMR: 1,607 calories  This is your "Basal Metabolic Rate". This is the amount of calories required to maintain current body weight with no activity.         MEDICAL PROBLEMS ADDRESSED TODAY     1. General medical exam    2. Need for vaccination  -     varicella-zoster gE-AS01B, PF, (SHINGRIX, PF,) 50 mcg/0.5 mL injection; Inject 0.5 mLs into the muscle once. for 1 dose  Dispense: 1 each; Refill: 0  -     " diphth,pertus,acell,,tetanus (BOOSTRIX) 2.5-8-5 Lf-mcg-Lf/0.5mL Susp; Inject 0.5 mLs into the muscle once. for 1 dose  Dispense: 0.5 mL; Refill: 0    3. Hyperglycemia  Assessment & Plan:  Follow a low carbohydrate, heart healthy diet. Work towards weight loss and continue current exercise routine. Repeat lab testing in 3-6 months with your primary care physician.     4. Acute non-recurrent maxillary sinusitis  Assessment & Plan:    Orders:  -     amoxicillin-clavulanate 875-125mg (AUGMENTIN) 875-125 mg per tablet; Take 1 tablet by mouth every 12 (twelve) hours. for 7 days  Dispense: 14 tablet; Refill: 0    5. Hereditary hemochromatosis  Assessment & Plan:  Unsure if increased ferritin is due to HH and phlebotomy is needed at this time or an acute phase reactant from acute sinusitis. Will repeat ferritin at next lab draw and refer if needed.            FINAL RECOMMENDATIONS     Follow heart healthy diet that is low in sugar and processed carbohydrates. Repeat HbA1c in 3-6 months with your primary care physician.   Take Augmentin (antibiotic) for your sinus infection. See your primary care physician in 3-4 weeks if your cough persists. Continue your over the counter medications for your symptoms. Zyrtec, nasal steroids, mucinex and tylenol.   Get your shingles and tetanus/diptheria/pertussis vaccines  Get labs (Ferritin, HbA1c and CBC) in 12 weeks. Order in chart.     Early identification and prevention are the keys to maintaining overall health and prevention of chronic diseases. For this reason, I recommend yearly physical examinations through this program or with your primary care physician.     Follow up in about 1 year (around 12/18/2024) for an [facility] Physical.    It was a pleasure taking care of you, Fr. Garth Flores. Thank you for using the [facility].            [facility]  [address]   [place], [place]   [address]

## 2023-12-18 NOTE — ASSESSMENT & PLAN NOTE
Prescription for Augmentin provided    Over-the-counter medications for symptomatic relief discussed at length.  Red flags and indication for immediate medical attention discussed. Patient is receptive, expresses understanding and agreeable to plan. All questions answered

## 2023-12-18 NOTE — ASSESSMENT & PLAN NOTE
Unsure if increased ferritin is due to HH and phlebotomy is needed at this time or an acute phase reactant from acute sinusitis. Will repeat ferritin at next lab draw and refer if needed.

## 2023-12-18 NOTE — PATIENT INSTRUCTIONS
Ck Liang,     If you are due for any health screening(s) below please notify me so we can arrange them to be ordered and scheduled. Most healthy patients at your age complete them, but you are free to accept or refuse.     If you can't do it, I'll definitely understand. If you can, I'd certainly appreciate it!    All of your core healthy metrics are met.

## 2024-01-19 DIAGNOSIS — F32.A ANXIETY AND DEPRESSION: ICD-10-CM

## 2024-01-19 DIAGNOSIS — F41.9 ANXIETY AND DEPRESSION: ICD-10-CM

## 2024-01-19 RX ORDER — VORTIOXETINE 5 MG/1
5 TABLET, FILM COATED ORAL DAILY
Qty: 90 TABLET | Refills: 0 | Status: SHIPPED | OUTPATIENT
Start: 2024-01-19 | End: 2024-01-22 | Stop reason: SDUPTHER

## 2024-01-19 NOTE — TELEPHONE ENCOUNTER
Refill for Trintellix for in. Patient needs to schedule wellness visit. Last wellness was 10/2/22.

## 2024-01-22 DIAGNOSIS — F41.9 ANXIETY AND DEPRESSION: ICD-10-CM

## 2024-01-22 DIAGNOSIS — F32.A ANXIETY AND DEPRESSION: ICD-10-CM

## 2024-01-22 RX ORDER — VORTIOXETINE 5 MG/1
5 TABLET, FILM COATED ORAL DAILY
Qty: 90 TABLET | Refills: 0 | Status: SHIPPED | OUTPATIENT
Start: 2024-01-22 | End: 2024-05-08

## 2024-03-18 PROBLEM — J01.00 ACUTE NON-RECURRENT MAXILLARY SINUSITIS: Status: RESOLVED | Noted: 2023-12-18 | Resolved: 2024-03-18

## 2024-05-08 ENCOUNTER — OFFICE VISIT (OUTPATIENT)
Dept: INTERNAL MEDICINE | Facility: CLINIC | Age: 49
End: 2024-05-08
Payer: COMMERCIAL

## 2024-05-08 VITALS
DIASTOLIC BLOOD PRESSURE: 82 MMHG | RESPIRATION RATE: 18 BRPM | HEART RATE: 69 BPM | SYSTOLIC BLOOD PRESSURE: 124 MMHG | BODY MASS INDEX: 32.15 KG/M2 | OXYGEN SATURATION: 96 % | TEMPERATURE: 98 F | WEIGHT: 199.19 LBS

## 2024-05-08 DIAGNOSIS — Z13.21 SCREENING FOR ENDOCRINE, NUTRITIONAL, METABOLIC AND IMMUNITY DISORDER: ICD-10-CM

## 2024-05-08 DIAGNOSIS — Z13.0 SCREENING FOR ENDOCRINE, NUTRITIONAL, METABOLIC AND IMMUNITY DISORDER: ICD-10-CM

## 2024-05-08 DIAGNOSIS — Z13.89 SCREENING FOR CARDIOVASCULAR, RESPIRATORY, AND GENITOURINARY DISEASES: ICD-10-CM

## 2024-05-08 DIAGNOSIS — Z12.5 SCREENING FOR MALIGNANT NEOPLASM OF PROSTATE: ICD-10-CM

## 2024-05-08 DIAGNOSIS — Z13.29 SCREENING FOR ENDOCRINE, NUTRITIONAL, METABOLIC AND IMMUNITY DISORDER: ICD-10-CM

## 2024-05-08 DIAGNOSIS — Z13.228 SCREENING FOR ENDOCRINE, NUTRITIONAL, METABOLIC AND IMMUNITY DISORDER: ICD-10-CM

## 2024-05-08 DIAGNOSIS — E78.2 MIXED HYPERLIPIDEMIA: Chronic | ICD-10-CM

## 2024-05-08 DIAGNOSIS — Z13.83 SCREENING FOR CARDIOVASCULAR, RESPIRATORY, AND GENITOURINARY DISEASES: ICD-10-CM

## 2024-05-08 DIAGNOSIS — F41.9 ANXIETY: ICD-10-CM

## 2024-05-08 DIAGNOSIS — F33.41 RECURRENT MAJOR DEPRESSIVE DISORDER, IN PARTIAL REMISSION: ICD-10-CM

## 2024-05-08 DIAGNOSIS — Z13.6 SCREENING FOR CARDIOVASCULAR, RESPIRATORY, AND GENITOURINARY DISEASES: ICD-10-CM

## 2024-05-08 DIAGNOSIS — F41.9 ANXIETY AND DEPRESSION: ICD-10-CM

## 2024-05-08 DIAGNOSIS — F32.A ANXIETY AND DEPRESSION: ICD-10-CM

## 2024-05-08 DIAGNOSIS — R53.83 FATIGUE, UNSPECIFIED TYPE: ICD-10-CM

## 2024-05-08 DIAGNOSIS — Z00.00 WELLNESS EXAMINATION: Primary | ICD-10-CM

## 2024-05-08 DIAGNOSIS — Z79.899 ENCOUNTER FOR LONG-TERM CURRENT USE OF MEDICATION: ICD-10-CM

## 2024-05-08 PROCEDURE — 3079F DIAST BP 80-89 MM HG: CPT | Mod: CPTII,,,

## 2024-05-08 PROCEDURE — 3044F HG A1C LEVEL LT 7.0%: CPT | Mod: CPTII,,,

## 2024-05-08 PROCEDURE — 99396 PREV VISIT EST AGE 40-64: CPT | Mod: ,,,

## 2024-05-08 PROCEDURE — 1159F MED LIST DOCD IN RCRD: CPT | Mod: CPTII,,,

## 2024-05-08 PROCEDURE — 3074F SYST BP LT 130 MM HG: CPT | Mod: CPTII,,,

## 2024-05-08 PROCEDURE — 1160F RVW MEDS BY RX/DR IN RCRD: CPT | Mod: CPTII,,,

## 2024-05-08 PROCEDURE — 3008F BODY MASS INDEX DOCD: CPT | Mod: CPTII,,,

## 2024-05-08 RX ORDER — VORTIOXETINE 5 MG/1
5 TABLET, FILM COATED ORAL DAILY
Qty: 90 TABLET | Refills: 0 | Status: SHIPPED | OUTPATIENT
Start: 2024-05-08

## 2024-05-08 RX ORDER — PROPRANOLOL HYDROCHLORIDE 60 MG/1
60 CAPSULE, EXTENDED RELEASE ORAL DAILY
Qty: 30 CAPSULE | Refills: 11 | Status: SHIPPED | OUTPATIENT
Start: 2024-05-08 | End: 2025-05-08

## 2024-05-08 NOTE — PROGRESS NOTES
Patient ID: Garth Flores is a 48 y.o. male.    Chief Complaint: Annual Exam (Pt states he has concerns with fatigue, h.pylori stomach issues, anxiety depression, wants psych referral )    Garth is a 48-year-old gentleman who was seen today for a wellness visit. He was followed by Alexandria Yanez at [facility]. Apparently does have a diagnosis of hemochromatosis and has he did therapeutic phlebotomy in the past.      Also has a history of multiple STDs including chlamydia, gonorrhea, genital warts etc. Admits to having multiple sexual partners. Has tried Truvada for preventative purposes however had elevation in LFTs and it was discontinued.   Also has chronic allergy issues and depression.    Since last visit patient reports he has been fairly well without acute injury.  Does have some ongoing concerns regarding fatigue.  Symptom onset has been going on for several months.  No fevers, recurrent infections, or unintended weight loss.  Discussion regarding obtaining further blood work, for which patient was in agreeance to.  Does have suboptimally controlled depression despite utilizing Trintellix 10 mg daily.  Per patient has tried multiple anxiety/depression medications in the past all being stopped due to feeling ineffective or numb.  Discussed adjustment on Trintellix, for which patient was in agreeance to.  In addition to adding propranolol to further aid anxiety.  Due to multiple failures discussion had regarding referral with Psychiatry, for which patient was open to having.  Age-appropriate screenings reviewed and discussed.  Otherwise stable for today's visit.        MEDICAL HISTORY:    Past Medical History:   Diagnosis Date    Allergies     Anxiety disorder, unspecified     Depression     Hemochromatosis, unspecified     History of gonorrhea 10/03/2022    History of sexually transmitted disease     Mixed hyperlipidemia 10/03/2022      Past Surgical History:   Procedure Laterality Date    COLONOSCOPY   2023    JIMY Manriquez MD/ f/u 10 years      Social History     Tobacco Use    Smoking status: Former     Current packs/day: 0.00     Average packs/day: 0.5 packs/day for 17.0 years (8.5 ttl pk-yrs)     Types: Cigarettes     Start date: 1999     Quit date: 2016     Years since quittin.3    Smokeless tobacco: Never    Tobacco comments:     Two periods of cigarette use, 3-5 years each   Substance Use Topics    Alcohol use: Yes     Alcohol/week: 2.0 standard drinks of alcohol     Types: 2 Drinks containing 0.5 oz of alcohol per week    Drug use: Never     Types: Marijuana          Health Maintenance Due   Topic Date Due    TETANUS VACCINE  2001    COVID-19 Vaccine ( season) 2023    Shingles Vaccine (1 of 2) Never done          Patient Care Team:  Joellen Fournier MD as PCP - General (Internal Medicine)  [facility]  Rodolfo Greene MD as Consulting Physician (Cardiology)  Kishor Rosales MD as Consulting Physician (Allergy)  Elliot Cho MD as Consulting Physician (Urology)  Madalyn Munoz LPN as Care Coordinator  Isacc Manriquez MD as Consulting Physician (Gastroenterology)  Romeo Alvarez DC (Chiropractic Medicine)      Review of Systems   Constitutional:  Positive for fatigue. Negative for fever.   HENT:  Negative for congestion, rhinorrhea, sore throat and trouble swallowing.    Eyes:  Negative for redness and visual disturbance.   Respiratory:  Negative for cough, chest tightness and shortness of breath.    Cardiovascular:  Negative for chest pain and palpitations.   Gastrointestinal:  Negative for abdominal pain, constipation, diarrhea, nausea and vomiting.   Genitourinary:  Negative for dysuria, flank pain, frequency and urgency.   Musculoskeletal:  Negative for arthralgias, gait problem and myalgias.   Skin:  Negative for rash and wound.   Neurological:  Negative for facial asymmetry, speech difficulty, weakness and  headaches.   All other systems reviewed and are negative.      Objective:   /82 (BP Location: Right arm, Patient Position: Sitting, BP Method: Large (Automatic))   Pulse 69   Temp 97.8 °F (36.6 °C) (Temporal)   Resp 18   Wt 90.4 kg (199 lb 3.2 oz)   SpO2 96%   BMI 32.15 kg/m²      Physical Exam  Constitutional:       General: He is not in acute distress.     Appearance: Normal appearance.   HENT:      Right Ear: Tympanic membrane, ear canal and external ear normal.      Left Ear: Tympanic membrane, ear canal and external ear normal.      Nose: Nose normal.      Mouth/Throat:      Mouth: Mucous membranes are moist.      Pharynx: Oropharynx is clear.   Eyes:      Extraocular Movements: Extraocular movements intact.      Conjunctiva/sclera: Conjunctivae normal.      Pupils: Pupils are equal, round, and reactive to light.   Cardiovascular:      Rate and Rhythm: Normal rate and regular rhythm.      Pulses: Normal pulses.      Heart sounds: Normal heart sounds. No murmur heard.     No gallop.   Pulmonary:      Effort: Pulmonary effort is normal.      Breath sounds: Normal breath sounds. No wheezing.   Abdominal:      General: Bowel sounds are normal. There is no distension.      Palpations: Abdomen is soft. There is no mass.      Tenderness: There is no abdominal tenderness. There is no guarding.   Musculoskeletal:         General: Normal range of motion.   Skin:     General: Skin is warm and dry.   Neurological:      Mental Status: He is alert. Mental status is at baseline.      Sensory: No sensory deficit.      Motor: No weakness.           Assessment:       ICD-10-CM ICD-9-CM   1. Wellness examination  Z00.00 V70.0   2. Mixed hyperlipidemia  E78.2 272.2   3. Anxiety and depression  F41.9 300.00    F32.A 311   4. Recurrent major depressive disorder, in partial remission  F33.41 296.35   5. Fatigue, unspecified type  R53.83 780.79   6. Anxiety  F41.9 300.00   7. Screening for endocrine, nutritional, metabolic  and immunity disorder  Z13.29 V77.99    Z13.21     Z13.228     Z13.0    8. Screening for cardiovascular, respiratory, and genitourinary diseases  Z13.6 V81.2    Z13.89 V81.6    Z13.83 V81.4   9. Encounter for long-term current use of medication  Z79.899 V58.69   10. Screening for malignant neoplasm of prostate  Z12.5 V76.44        Plan:     Problem List Items Addressed This Visit          Psychiatric    Anxiety and depression (Chronic)     -acute on chronic   -currently on Trintellix 10 mg daily, increase to 15 mg daily   -add propranolol 60 mg daily to further aid with anxiety  -previous failure on multiple anxiety/depression medications, refer to Psychiatry  -obtain baseline labs rule out underlying issue         Relevant Medications    vortioxetine (TRINTELLIX) 10 mg Tab    vortioxetine (TRINTELLIX) 5 mg Tab    Other Relevant Orders    TSH (Completed)    PSA, Screening (Completed)    Lipid Panel    Comprehensive Metabolic Panel (Completed)    CBC Auto Differential (Completed)    Vitamin D (Completed)    Hemoglobin A1C    Testosterone (Completed)    Recurrent major depressive disorder, in partial remission    Relevant Orders    TSH (Completed)    PSA, Screening (Completed)    Lipid Panel    Comprehensive Metabolic Panel (Completed)    CBC Auto Differential (Completed)    Vitamin D (Completed)    Hemoglobin A1C    Testosterone (Completed)    Ambulatory referral/consult to Psychiatry       Cardiac/Vascular    Mixed hyperlipidemia (Chronic)     -mildly elevated   -not currently on statin   -encouraged strict lifestyle modification   -low-cholesterol diet  -okay to utilize OTC flaxseed, Omega 3 fatty acid, red yeast rice extract            Other    Fatigue     -ongoing   -order CBC, CMP, TSH, vitamin-D, and testosterone level  -history not well managed depression, see depression diagnosis         Relevant Orders    TSH (Completed)    PSA, Screening (Completed)    Lipid Panel    Comprehensive Metabolic Panel  (Completed)    CBC Auto Differential (Completed)    Vitamin D (Completed)    Hemoglobin A1C    Testosterone (Completed)    Wellness examination - Primary     -patient feeling generally well today  -will obtain wellness labs within next few days  -age-appropriate screenings up-to-date  -immunizations up-to-date  -encourage routine aerobic exercise 2 to 3 times a week  -increase fluid hydration           Relevant Orders    TSH (Completed)    PSA, Screening (Completed)    Lipid Panel    Comprehensive Metabolic Panel (Completed)    CBC Auto Differential (Completed)    Vitamin D (Completed)    Hemoglobin A1C    Testosterone (Completed)     Other Visit Diagnoses       Anxiety        Relevant Orders    Ambulatory referral/consult to Psychiatry    Screening for endocrine, nutritional, metabolic and immunity disorder        Relevant Orders    TSH (Completed)    PSA, Screening (Completed)    Lipid Panel    Comprehensive Metabolic Panel (Completed)    CBC Auto Differential (Completed)    Vitamin D (Completed)    Hemoglobin A1C    Testosterone (Completed)    Screening for cardiovascular, respiratory, and genitourinary diseases        Relevant Orders    TSH (Completed)    PSA, Screening (Completed)    Lipid Panel    Comprehensive Metabolic Panel (Completed)    CBC Auto Differential (Completed)    Vitamin D (Completed)    Hemoglobin A1C    Testosterone (Completed)    Encounter for long-term current use of medication        Relevant Orders    TSH (Completed)    PSA, Screening (Completed)    Lipid Panel    Comprehensive Metabolic Panel (Completed)    CBC Auto Differential (Completed)    Vitamin D (Completed)    Hemoglobin A1C    Testosterone (Completed)    Screening for malignant neoplasm of prostate        Relevant Orders    TSH (Completed)    PSA, Screening (Completed)    Lipid Panel    Comprehensive Metabolic Panel (Completed)    CBC Auto Differential (Completed)    Vitamin D (Completed)    Hemoglobin A1C    Testosterone  (Completed)               Follow up in about 2 months (around 7/8/2024) for Medication Evaluation.   -plan specifics discussed above    Orders Placed This Encounter    TSH    PSA, Screening    Lipid Panel    Comprehensive Metabolic Panel    CBC Auto Differential    Vitamin D    Hemoglobin A1C    Testosterone    Ambulatory referral/consult to Psychiatry    vortioxetine (TRINTELLIX) 10 mg Tab    vortioxetine (TRINTELLIX) 5 mg Tab    propranoloL (INDERAL LA) 60 MG 24 hr capsule        Medication List with Changes/Refills   New Medications    PROPRANOLOL (INDERAL LA) 60 MG 24 HR CAPSULE    Take 1 capsule (60 mg total) by mouth once daily.   Current Medications    ALBUTEROL (PROVENTIL/VENTOLIN HFA) 90 MCG/ACTUATION INHALER    INHALE 2 PUFFS INTO THE LUNGS EVERY 6 (SIX) HOURS AS NEEDED FOR WHEEZING. RESCUE    CETIRIZINE (ZYRTEC) 10 MG CAP    Take 1 tablet by mouth Daily.   Changed and/or Refilled Medications    Modified Medication Previous Medication    VORTIOXETINE (TRINTELLIX) 10 MG TAB vortioxetine (TRINTELLIX) 10 mg Tab       Take 1 tablet (10 mg total) by mouth Daily.    Take 1 tablet (10 mg total) by mouth Daily.    VORTIOXETINE (TRINTELLIX) 5 MG TAB vortioxetine (TRINTELLIX) 5 mg Tab       Take 1 tablet (5 mg total) by mouth Daily.    Take 1 tablet (5 mg total) by mouth Daily.   Discontinued Medications    GUAIFENESIN (MUCINEX) 600 MG 12 HR TABLET    Take 1,200 mg by mouth 2 (two) times daily.    PHENYLEPHRINE (SUDAFED PE) 10 MG TAB    Take 10 mg by mouth every 4 (four) hours as needed.

## 2024-05-10 LAB
25(OH)D3+25(OH)D2 SERPL-MCNC: 43 NG/ML
CHOLEST SERPL-MSCNC: 207 MG/DL (ref 0–200)
HBA1C MFR BLD: 6.2 % (ref 4–6)
HDLC SERPL-MCNC: 36 MG/DL (ref 35–70)
LDLC SERPL CALC-MCNC: 143 MG/DL (ref 0–160)
PSA SERPL-MCNC: 4 NG/ML
TESTOST SERPL-MCNC: 340 NG/DL
TRIGL SERPL-MCNC: 141 MG/DL (ref 40–160)
TSH SERPL DL<=0.005 MIU/L-ACNC: 1.85 UIU/ML (ref 0.41–5.9)

## 2024-05-13 ENCOUNTER — PATIENT OUTREACH (OUTPATIENT)
Dept: ADMINISTRATIVE | Facility: HOSPITAL | Age: 49
End: 2024-05-13
Payer: COMMERCIAL

## 2024-05-13 PROBLEM — F33.41 RECURRENT MAJOR DEPRESSIVE DISORDER, IN PARTIAL REMISSION: Status: ACTIVE | Noted: 2024-05-13

## 2024-05-13 PROBLEM — R53.83 FATIGUE: Status: ACTIVE | Noted: 2024-05-13

## 2024-05-13 PROBLEM — F41.9 ANXIETY AND DEPRESSION: Chronic | Status: ACTIVE | Noted: 2022-10-03

## 2024-05-13 PROBLEM — F32.A ANXIETY AND DEPRESSION: Chronic | Status: ACTIVE | Noted: 2022-10-03

## 2024-05-13 PROBLEM — Z00.00 WELLNESS EXAMINATION: Status: ACTIVE | Noted: 2024-05-13

## 2024-05-13 NOTE — ASSESSMENT & PLAN NOTE
-acute on chronic   -currently on Trintellix 10 mg daily, increase to 15 mg daily   -add propranolol 60 mg daily to further aid with anxiety  -previous failure on multiple anxiety/depression medications, refer to Psychiatry  -obtain baseline labs rule out underlying issue

## 2024-05-13 NOTE — PROGRESS NOTES
Health Maintenance Topic(s) Outreach Outcomes & Actions Taken:    Lab(s) - Outreach Outcomes & Actions Taken  : External Records Uploaded & Care Team Updated if Applicable     Additional Notes:  Upload Lab: Lipid/A1C

## 2024-05-13 NOTE — ASSESSMENT & PLAN NOTE
-ongoing   -order CBC, CMP, TSH, vitamin-D, and testosterone level  -history not well managed depression, see depression diagnosis

## 2024-05-13 NOTE — ASSESSMENT & PLAN NOTE
-mildly elevated   -not currently on statin   -encouraged strict lifestyle modification   -low-cholesterol diet  -okay to utilize OTC flaxseed, Omega 3 fatty acid, red yeast rice extract

## 2024-05-14 ENCOUNTER — PATIENT MESSAGE (OUTPATIENT)
Dept: INTERNAL MEDICINE | Facility: CLINIC | Age: 49
End: 2024-05-14
Payer: COMMERCIAL

## 2024-05-15 NOTE — TELEPHONE ENCOUNTER
Please contact patient. Inform that results will not show in results as typically would if they were done via [facility] facility, since labs were done at external clinic results were scanned in as a document after we receive them via fax from the external clinic.  He may be able to view the scanned image via media, or we can print the external results and mail it to him.

## 2024-06-18 ENCOUNTER — TELEPHONE (OUTPATIENT)
Dept: INTERNAL MEDICINE | Facility: CLINIC | Age: 49
End: 2024-06-18
Payer: COMMERCIAL

## 2024-06-18 ENCOUNTER — PATIENT MESSAGE (OUTPATIENT)
Dept: INTERNAL MEDICINE | Facility: CLINIC | Age: 49
End: 2024-06-18
Payer: COMMERCIAL

## 2024-06-18 NOTE — TELEPHONE ENCOUNTER
Spoke with Dr Fournier, she did not want to change any medications. Patient needs to see Saray Han. I spoke with their office and they tried 3 times to reach him. Let him know to contact them to set up appt. If he is currenlty having SI he should seek ER for evaluation.

## 2024-07-17 ENCOUNTER — TELEPHONE (OUTPATIENT)
Dept: INTERNAL MEDICINE | Facility: CLINIC | Age: 49
End: 2024-07-17
Payer: COMMERCIAL

## 2024-07-24 ENCOUNTER — TELEPHONE (OUTPATIENT)
Dept: INTERNAL MEDICINE | Facility: CLINIC | Age: 49
End: 2024-07-24

## 2024-07-24 NOTE — TELEPHONE ENCOUNTER
----- Message from Terra Baxter sent at 7/24/2024  9:22 AM CDT -----  .Type:  Patient Returning Call    Who Called:pt  Who Left Message for Patient:pt  Does the patient know what this is regarding?:appt  Would the patient rather a call back or a response via [facility]? mere  Best Call Back Number:[phone]   Additional Information: Zully please call back about appt. Please leave a detail message about appt. Patient is not available to take calls

## 2024-07-24 NOTE — TELEPHONE ENCOUNTER
Left message for patient. He is being followed by Psych so they are handling his medications. Dr Fournier said he can follow up for 6 month appt. Scheduled appt for jan 24, at 9:00

## 2024-08-12 PROBLEM — Z00.00 WELLNESS EXAMINATION: Status: RESOLVED | Noted: 2024-05-13 | Resolved: 2024-08-12

## 2024-09-05 DIAGNOSIS — F32.A ANXIETY AND DEPRESSION: ICD-10-CM

## 2024-09-05 DIAGNOSIS — F41.9 ANXIETY AND DEPRESSION: ICD-10-CM

## 2024-09-06 RX ORDER — VORTIOXETINE 10 MG/1
10 TABLET, FILM COATED ORAL
Qty: 90 TABLET | Refills: 3 | Status: SHIPPED | OUTPATIENT
Start: 2024-09-06

## 2024-09-06 RX ORDER — VORTIOXETINE 5 MG/1
5 TABLET, FILM COATED ORAL
Qty: 90 TABLET | Refills: 3 | Status: SHIPPED | OUTPATIENT
Start: 2024-09-06

## 2025-01-06 ENCOUNTER — TELEPHONE (OUTPATIENT)
Dept: INTERNAL MEDICINE | Facility: CLINIC | Age: 50
End: 2025-01-06
Payer: COMMERCIAL

## 2025-01-16 ENCOUNTER — TELEPHONE (OUTPATIENT)
Dept: INTERNAL MEDICINE | Facility: CLINIC | Age: 50
End: 2025-01-16
Payer: COMMERCIAL

## 2025-04-24 DIAGNOSIS — Z00.00 WELLNESS EXAMINATION: Primary | ICD-10-CM

## 2025-04-24 DIAGNOSIS — Z13.6 SCREENING FOR CARDIOVASCULAR, RESPIRATORY, AND GENITOURINARY DISEASES: ICD-10-CM

## 2025-04-24 DIAGNOSIS — Z13.89 SCREENING FOR CARDIOVASCULAR, RESPIRATORY, AND GENITOURINARY DISEASES: ICD-10-CM

## 2025-04-24 DIAGNOSIS — Z12.5 SCREENING FOR PROSTATE CANCER: ICD-10-CM

## 2025-04-24 DIAGNOSIS — Z79.899 ENCOUNTER FOR LONG-TERM CURRENT USE OF MEDICATION: ICD-10-CM

## 2025-04-24 DIAGNOSIS — Z13.21 SCREENING FOR ENDOCRINE, NUTRITIONAL, METABOLIC AND IMMUNITY DISORDER: ICD-10-CM

## 2025-04-24 DIAGNOSIS — Z13.29 SCREENING FOR ENDOCRINE, NUTRITIONAL, METABOLIC AND IMMUNITY DISORDER: ICD-10-CM

## 2025-04-24 DIAGNOSIS — Z13.0 SCREENING FOR ENDOCRINE, NUTRITIONAL, METABOLIC AND IMMUNITY DISORDER: ICD-10-CM

## 2025-04-24 DIAGNOSIS — E78.2 MIXED HYPERLIPIDEMIA: ICD-10-CM

## 2025-04-24 DIAGNOSIS — Z13.83 SCREENING FOR CARDIOVASCULAR, RESPIRATORY, AND GENITOURINARY DISEASES: ICD-10-CM

## 2025-04-24 DIAGNOSIS — Z13.228 SCREENING FOR ENDOCRINE, NUTRITIONAL, METABOLIC AND IMMUNITY DISORDER: ICD-10-CM

## 2025-05-07 ENCOUNTER — TELEPHONE (OUTPATIENT)
Dept: INTERNAL MEDICINE | Facility: CLINIC | Age: 50
End: 2025-05-07
Payer: COMMERCIAL

## 2025-05-07 NOTE — TELEPHONE ENCOUNTER
----- Message from Med Assistant Brown sent at 4/24/2025  2:21 PM CDT -----  Regarding: PV Wednesday 5-14-25  Wellness AppointmentFasting wellness labs ordered and ready to do. Last Wellness 5-8-24

## 2025-05-30 DIAGNOSIS — Z00.00 GENERAL MEDICAL EXAM: Primary | ICD-10-CM

## 2025-08-13 ENCOUNTER — CLINICAL SUPPORT (OUTPATIENT)
Dept: INTERNAL MEDICINE | Facility: CLINIC | Age: 50
End: 2025-08-13
Payer: COMMERCIAL

## 2025-08-13 VITALS
TEMPERATURE: 98 F | BODY MASS INDEX: 29.89 KG/M2 | OXYGEN SATURATION: 96 % | HEIGHT: 66 IN | HEART RATE: 61 BPM | WEIGHT: 186 LBS | SYSTOLIC BLOOD PRESSURE: 132 MMHG | DIASTOLIC BLOOD PRESSURE: 89 MMHG

## 2025-08-13 DIAGNOSIS — Z00.00 GENERAL MEDICAL EXAM: Primary | ICD-10-CM

## 2025-08-13 DIAGNOSIS — L73.8 PITYROSPORUM FOLLICULITIS: ICD-10-CM

## 2025-08-13 DIAGNOSIS — E11.9 TYPE 2 DIABETES MELLITUS WITHOUT COMPLICATION, WITHOUT LONG-TERM CURRENT USE OF INSULIN: ICD-10-CM

## 2025-08-13 DIAGNOSIS — E78.2 MIXED HYPERLIPIDEMIA: Chronic | ICD-10-CM

## 2025-08-13 DIAGNOSIS — E66.9 OBESITY (BMI 30-39.9): ICD-10-CM

## 2025-08-13 LAB
(HCYS)2 SERPL-MCNC: 7.6 UMOL/L (ref 5.1–15.4)
25(OH)D3+25(OH)D2 SERPL-MCNC: 49 NG/ML (ref 30–80)
ALBUMIN SERPL-MCNC: 4.1 G/DL (ref 3.5–5)
ALBUMIN/GLOB SERPL: 1.2 RATIO (ref 1.1–2)
ALP SERPL-CCNC: 76 UNIT/L (ref 40–150)
ALT SERPL-CCNC: 32 UNIT/L (ref 0–55)
ANION GAP SERPL CALC-SCNC: 7 MEQ/L
AST SERPL-CCNC: 25 UNIT/L (ref 11–45)
BACTERIA #/AREA URNS AUTO: ABNORMAL /HPF
BASOPHILS # BLD AUTO: 0.05 X10(3)/MCL
BASOPHILS NFR BLD AUTO: 1 %
BILIRUB SERPL-MCNC: 0.4 MG/DL
BILIRUB UR QL STRIP.AUTO: NEGATIVE
BUN SERPL-MCNC: 16.6 MG/DL (ref 8.4–25.7)
CALCIUM SERPL-MCNC: 9.5 MG/DL (ref 8.4–10.2)
CHLORIDE SERPL-SCNC: 107 MMOL/L (ref 98–107)
CHOLEST SERPL-MCNC: 255 MG/DL
CHOLEST/HDLC SERPL: 7 {RATIO} (ref 0–5)
CK SERPL-CCNC: 153 U/L (ref 30–200)
CLARITY UR: CLEAR
CO2 SERPL-SCNC: 25 MMOL/L (ref 22–29)
COLOR UR AUTO: ABNORMAL
CREAT SERPL-MCNC: 0.93 MG/DL (ref 0.72–1.25)
CREAT/UREA NIT SERPL: 18
CRP SERPL HS-MCNC: 1.27 MG/L
CV STRESS BASE HR: 56 BPM
DIASTOLIC BLOOD PRESSURE: 74 MMHG
DLCO: NORMAL
EOSINOPHIL # BLD AUTO: 0.12 X10(3)/MCL (ref 0–0.9)
EOSINOPHIL NFR BLD AUTO: 2.3 %
ERYTHROCYTE [DISTWIDTH] IN BLOOD BY AUTOMATED COUNT: 13.3 % (ref 11.5–17)
EST. AVERAGE GLUCOSE BLD GHB EST-MCNC: 131.2 MG/DL
FERRITIN SERPL-MCNC: 217.82 NG/ML (ref 21.81–274.66)
FET: 7.97 S
FEV1/FVC: 75.5 %
FEV1: 3.87 LITERS
FVC: 5.13 LITERS
GFR SERPLBLD CREATININE-BSD FMLA CKD-EPI: >60 ML/MIN/1.73/M2
GLOBULIN SER-MCNC: 3.5 GM/DL (ref 2.4–3.5)
GLUCOSE SERPL-MCNC: 106 MG/DL (ref 74–100)
GLUCOSE UR QL STRIP: ABNORMAL
HBA1C MFR BLD: 6.2 %
HCT VFR BLD AUTO: 52.2 % (ref 42–52)
HCV AB SERPL QL IA: NONREACTIVE
HDLC SERPL-MCNC: 39 MG/DL (ref 35–60)
HGB BLD-MCNC: 17.2 G/DL (ref 14–18)
HGB UR QL STRIP: NEGATIVE
HIV 1+2 AB+HIV1 P24 AG SERPL QL IA: NONREACTIVE
HYALINE CASTS #/AREA URNS LPF: ABNORMAL /LPF
IMM GRANULOCYTES # BLD AUTO: 0 X10(3)/MCL (ref 0–0.04)
IMM GRANULOCYTES NFR BLD AUTO: 0 %
KETONES UR QL STRIP: NEGATIVE
LDLC SERPL CALC-MCNC: 180 MG/DL (ref 50–140)
LEUKOCYTE ESTERASE UR QL STRIP: NEGATIVE
LYMPHOCYTES # BLD AUTO: 2.45 X10(3)/MCL (ref 0.6–4.6)
LYMPHOCYTES NFR BLD AUTO: 47.3 %
Lab: 3.02 L/S
MCH RBC QN AUTO: 27.8 PG (ref 27–31)
MCHC RBC AUTO-ENTMCNC: 33 G/DL (ref 33–36)
MCV RBC AUTO: 84.5 FL (ref 80–94)
MONOCYTES # BLD AUTO: 0.44 X10(3)/MCL (ref 0.1–1.3)
MONOCYTES NFR BLD AUTO: 8.5 %
NEUTROPHILS # BLD AUTO: 2.12 X10(3)/MCL (ref 2.1–9.2)
NEUTROPHILS NFR BLD AUTO: 40.9 %
NITRITE UR QL STRIP: NEGATIVE
NRBC BLD AUTO-RTO: 0 %
OHS CV CPX 1 MINUTE RECOVERY HEART RATE: 116 BPM
OHS CV CPX 85 PERCENT MAX PREDICTED HEART RATE MALE: 144
OHS CV CPX ESTIMATED METS: 13
OHS CV CPX MAX PREDICTED HEART RATE: 169
OHS CV CPX PATIENT IS FEMALE: 0
OHS CV CPX PATIENT IS MALE: 1
OHS CV CPX PEAK DIASTOLIC BLOOD PRESSURE: 81 MMHG
OHS CV CPX PEAK HEAR RATE: 144 BPM
OHS CV CPX PEAK RATE PRESSURE PRODUCT: NORMAL
OHS CV CPX PEAK SYSTOLIC BLOOD PRESSURE: 135 MMHG
OHS CV CPX PERCENT MAX PREDICTED HEART RATE ACHIEVED: 85
OHS CV CPX RATE PRESSURE PRODUCT PRESENTING: 6832
PEF: 10.42 L/S
PH UR STRIP: 6 [PH]
PLATELET # BLD AUTO: 256 X10(3)/MCL (ref 130–400)
PMV BLD AUTO: 9.8 FL (ref 7.4–10.4)
POTASSIUM SERPL-SCNC: 4.3 MMOL/L (ref 3.5–5.1)
PROT SERPL-MCNC: 7.6 GM/DL (ref 6.4–8.3)
PROT UR QL STRIP: NEGATIVE
PSA SERPL-MCNC: 0.67 NG/ML
RBC # BLD AUTO: 6.18 X10(6)/MCL (ref 4.7–6.1)
RBC #/AREA URNS AUTO: ABNORMAL /HPF
SODIUM SERPL-SCNC: 139 MMOL/L (ref 136–145)
SP GR UR STRIP.AUTO: 1.03 (ref 1–1.03)
SQUAMOUS #/AREA URNS LPF: ABNORMAL /HPF
STRESS ECHO POST EXERCISE DUR MIN: 11 MINUTES
STRESS ECHO POST EXERCISE DUR SEC: 0 SECONDS
SYSTOLIC BLOOD PRESSURE: 122 MMHG
T3FREE SERPL-MCNC: 2.34 PG/ML (ref 1.58–3.91)
T4 FREE SERPL-MCNC: 0.99 NG/DL (ref 0.7–1.48)
TESTOST SERPL-MCNC: 382.02 NG/DL (ref 220.91–715.81)
TLC: NORMAL
TRIGL SERPL-MCNC: 178 MG/DL (ref 34–140)
TSH SERPL-ACNC: 1.2 UIU/ML (ref 0.35–4.94)
URATE SERPL-MCNC: 4.4 MG/DL (ref 3.5–7.2)
UROBILINOGEN UR STRIP-ACNC: NORMAL
VLDLC SERPL CALC-MCNC: 36 MG/DL
WBC # BLD AUTO: 5.18 X10(3)/MCL (ref 4.5–11.5)
WBC #/AREA URNS AUTO: ABNORMAL /HPF

## 2025-08-13 PROCEDURE — 84153 ASSAY OF PSA TOTAL: CPT

## 2025-08-13 PROCEDURE — 84439 ASSAY OF FREE THYROXINE: CPT

## 2025-08-13 PROCEDURE — 84481 FREE ASSAY (FT-3): CPT

## 2025-08-13 PROCEDURE — 86803 HEPATITIS C AB TEST: CPT

## 2025-08-13 PROCEDURE — 80053 COMPREHEN METABOLIC PANEL: CPT

## 2025-08-13 PROCEDURE — 94010 BREATHING CAPACITY TEST: CPT | Mod: ,,, | Performed by: FAMILY MEDICINE

## 2025-08-13 PROCEDURE — 84550 ASSAY OF BLOOD/URIC ACID: CPT

## 2025-08-13 PROCEDURE — 97750 PHYSICAL PERFORMANCE TEST: CPT | Mod: ,,, | Performed by: FAMILY MEDICINE

## 2025-08-13 PROCEDURE — 82306 VITAMIN D 25 HYDROXY: CPT

## 2025-08-13 PROCEDURE — 99499 UNLISTED E&M SERVICE: CPT | Mod: ,,, | Performed by: FAMILY MEDICINE

## 2025-08-13 PROCEDURE — 82550 ASSAY OF CK (CPK): CPT

## 2025-08-13 PROCEDURE — 82728 ASSAY OF FERRITIN: CPT

## 2025-08-13 PROCEDURE — 86141 C-REACTIVE PROTEIN HS: CPT

## 2025-08-13 PROCEDURE — 84403 ASSAY OF TOTAL TESTOSTERONE: CPT

## 2025-08-13 PROCEDURE — 99396 PREV VISIT EST AGE 40-64: CPT | Mod: ,,, | Performed by: FAMILY MEDICINE

## 2025-08-13 PROCEDURE — 81001 URINALYSIS AUTO W/SCOPE: CPT

## 2025-08-13 PROCEDURE — 83036 HEMOGLOBIN GLYCOSYLATED A1C: CPT

## 2025-08-13 PROCEDURE — 99172 OCULAR FUNCTION SCREEN: CPT | Mod: ,,, | Performed by: FAMILY MEDICINE

## 2025-08-13 PROCEDURE — 84443 ASSAY THYROID STIM HORMONE: CPT

## 2025-08-13 PROCEDURE — 92551 PURE TONE HEARING TEST AIR: CPT | Mod: ,,, | Performed by: FAMILY MEDICINE

## 2025-08-13 PROCEDURE — 87389 HIV-1 AG W/HIV-1&-2 AB AG IA: CPT

## 2025-08-13 PROCEDURE — 80061 LIPID PANEL: CPT

## 2025-08-13 PROCEDURE — 0358T BIA WHOLE BODY: CPT | Mod: ,,, | Performed by: FAMILY MEDICINE

## 2025-08-13 PROCEDURE — 85025 COMPLETE CBC W/AUTO DIFF WBC: CPT

## 2025-08-13 PROCEDURE — 83090 ASSAY OF HOMOCYSTEINE: CPT

## 2025-08-13 RX ORDER — EMPAGLIFLOZIN 25 MG/1
25 TABLET, FILM COATED ORAL DAILY
COMMUNITY
Start: 2025-04-01

## 2025-08-13 RX ORDER — FLUCONAZOLE 100 MG/1
100 TABLET ORAL DAILY
Qty: 28 TABLET | Refills: 0 | Status: SHIPPED | OUTPATIENT
Start: 2025-08-13 | End: 2025-09-10

## 2025-08-14 LAB
OHS QRS DURATION: 100 MS
OHS QTC CALCULATION: 428 MS